# Patient Record
Sex: FEMALE | Race: WHITE | NOT HISPANIC OR LATINO | Employment: PART TIME | URBAN - METROPOLITAN AREA
[De-identification: names, ages, dates, MRNs, and addresses within clinical notes are randomized per-mention and may not be internally consistent; named-entity substitution may affect disease eponyms.]

---

## 2017-12-02 ENCOUNTER — HOSPITAL ENCOUNTER (EMERGENCY)
Facility: MEDICAL CENTER | Age: 27
End: 2017-12-02
Attending: EMERGENCY MEDICINE
Payer: COMMERCIAL

## 2017-12-02 VITALS
DIASTOLIC BLOOD PRESSURE: 80 MMHG | HEART RATE: 90 BPM | SYSTOLIC BLOOD PRESSURE: 140 MMHG | RESPIRATION RATE: 15 BRPM | TEMPERATURE: 98.6 F | HEIGHT: 68 IN | BODY MASS INDEX: 18.79 KG/M2 | WEIGHT: 124 LBS | OXYGEN SATURATION: 97 %
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
## 2017-12-02 LAB
HCG UR QL: NEGATIVE
HCG UR QL: NEGATIVE
SP GR UR REFRACTOMETRY: 1.03

## 2017-12-02 PROCEDURE — 99283 EMERGENCY DEPT VISIT LOW MDM: CPT

## 2017-12-02 PROCEDURE — 81025 URINE PREGNANCY TEST: CPT

## 2017-12-02 ASSESSMENT — PAIN SCALES - GENERAL
PAINLEVEL_OUTOF10: 7
PAINLEVEL_OUTOF10: 6

## 2017-12-02 ASSESSMENT — LIFESTYLE VARIABLES: DO YOU DRINK ALCOHOL: NO

## 2017-12-02 NOTE — ED NOTES
Pt given discharge and follow up instructions, all questions answered, pt verbalized understanding. Pt discharged with self, states feels safe to go home. Copy of discharge provided to pt. Signed copy in chart.  Pt states that all personal belongings are in possession. Pt ambulatory off unit.
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
## 2017-12-02 NOTE — DISCHARGE INSTRUCTIONS
General Assault  Assault includes any behavior or physical attack--whether it is on purpose or not--that results in injury to another person, damage to property, or both. This also includes assault that has not yet happened, but is planned to happen. Threats of assault may be physical, verbal, or written. They may be said or sent by:  · Mail.  · E-mail.  · Text.  · Social media.  · Fax.  The threats may be direct, implied, or understood.  WHAT ARE THE DIFFERENT FORMS OF ASSAULT?  Forms of assault include:  · Physically assaulting a person. This includes physical threats to inflict physical harm as well as:  ¨ Slapping.  ¨ Hitting.  ¨ Poking.  ¨ Kicking.  ¨ Punching.  ¨ Pushing.  · Sexually assaulting a person. Sexual assault is any sexual activity that a person is forced, threatened, or coerced to participate in. It may or may not involve physical contact with the person who is assaulting you. You are sexually assaulted if you are forced to have sexual contact of any kind.  · Damaging or destroying a person's assistive equipment, such as glasses, canes, or walkers.  · Throwing or hitting objects.  · Using or displaying a weapon to harm or threaten someone.  · Using or displaying an object that appears to be a weapon in a threatening manner.  · Using greater physical size or strength to intimidate someone.  · Making intimidating or threatening gestures.  · Bullying.  · Hazing.  · Using language that is intimidating, threatening, hostile, or abusive.  · Stalking.  · Restraining someone with force.  WHAT SHOULD I DO IF I EXPERIENCE ASSAULT?  · Report assaults, threats, and stalking to the police. Call your local emergency services (911 in the [place] if you are in immediate danger or you need medical help.   · You can work with a  or an advocate to get legal protection against someone who has assaulted you or threatened you with assault. Protection includes restraining orders and private addresses. Crimes against  you, such as assault, can also be prosecuted through the courts. Laws will vary depending on where you live.     This information is not intended to replace advice given to you by your health care provider. Make sure you discuss any questions you have with your health care provider.     Document Released: 12/18/2006 Document Revised: 01/08/2016 Document Reviewed: 09/04/2015  [employer] Interactive Patient Education ©2016 [employer]
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
## 2018-01-10 ENCOUNTER — NON-PROVIDER VISIT (OUTPATIENT)
Dept: OCCUPATIONAL MEDICINE | Facility: CLINIC | Age: 28
End: 2018-01-10

## 2018-01-10 DIAGNOSIS — Z02.1 PRE-EMPLOYMENT DRUG SCREENING: ICD-10-CM

## 2018-01-10 DIAGNOSIS — Z02.1 DRUG TESTING, PRE-EMPLOYMENT: ICD-10-CM

## 2018-01-10 LAB
AMP AMPHETAMINE: NORMAL
COC COCAINE: NORMAL
INT CON NEG: NORMAL
INT CON POS: NORMAL
MET METHAMPHETAMINES: NORMAL
OPI OPIATES: NORMAL
PCP PHENCYCLIDINE: NORMAL
POC DRUG COMMENT 753798-OCCUPATIONAL HEALTH: NEGATIVE
THC: NORMAL

## 2018-01-10 PROCEDURE — 80305 DRUG TEST PRSMV DIR OPT OBS: CPT | Performed by: PREVENTIVE MEDICINE

## 2018-01-11 ENCOUNTER — HOSPITAL ENCOUNTER (EMERGENCY)
Facility: MEDICAL CENTER | Age: 28
End: 2018-01-11
Attending: EMERGENCY MEDICINE
Payer: MEDICAID

## 2018-01-11 VITALS
OXYGEN SATURATION: 98 % | SYSTOLIC BLOOD PRESSURE: 121 MMHG | DIASTOLIC BLOOD PRESSURE: 65 MMHG | HEIGHT: 68 IN | HEART RATE: 74 BPM | RESPIRATION RATE: 16 BRPM | BODY MASS INDEX: 20.55 KG/M2 | WEIGHT: 135.58 LBS | TEMPERATURE: 97.5 F

## 2018-01-11 DIAGNOSIS — N72 CERVICITIS: ICD-10-CM

## 2018-01-11 LAB
APPEARANCE UR: ABNORMAL
BACTERIA GENITAL QL WET PREP: NORMAL
COLOR UR AUTO: YELLOW
GLUCOSE UR QL STRIP.AUTO: NEGATIVE MG/DL
HCG UR QL: NEGATIVE
KETONES UR QL STRIP.AUTO: NEGATIVE MG/DL
LEUKOCYTE ESTERASE UR QL STRIP.AUTO: NEGATIVE
NITRITE UR QL STRIP.AUTO: NEGATIVE
PH UR STRIP.AUTO: 7 [PH]
PROT UR QL STRIP: NEGATIVE MG/DL
RBC UR QL AUTO: NEGATIVE
SIGNIFICANT IND 70042: NORMAL
SITE SITE: NORMAL
SOURCE SOURCE: NORMAL
SP GR UR: 1.02

## 2018-01-11 PROCEDURE — 96372 THER/PROPH/DIAG INJ SC/IM: CPT

## 2018-01-11 PROCEDURE — 700102 HCHG RX REV CODE 250 W/ 637 OVERRIDE(OP): Performed by: EMERGENCY MEDICINE

## 2018-01-11 PROCEDURE — 700101 HCHG RX REV CODE 250: Performed by: EMERGENCY MEDICINE

## 2018-01-11 PROCEDURE — 81025 URINE PREGNANCY TEST: CPT

## 2018-01-11 PROCEDURE — A9270 NON-COVERED ITEM OR SERVICE: HCPCS | Performed by: EMERGENCY MEDICINE

## 2018-01-11 PROCEDURE — 700111 HCHG RX REV CODE 636 W/ 250 OVERRIDE (IP): Performed by: EMERGENCY MEDICINE

## 2018-01-11 PROCEDURE — 81002 URINALYSIS NONAUTO W/O SCOPE: CPT

## 2018-01-11 PROCEDURE — 87591 N.GONORRHOEAE DNA AMP PROB: CPT

## 2018-01-11 PROCEDURE — 87491 CHLMYD TRACH DNA AMP PROBE: CPT

## 2018-01-11 PROCEDURE — 99284 EMERGENCY DEPT VISIT MOD MDM: CPT

## 2018-01-11 RX ORDER — AZITHROMYCIN 250 MG/1
1000 TABLET, FILM COATED ORAL ONCE
Status: COMPLETED | OUTPATIENT
Start: 2018-01-11 | End: 2018-01-11

## 2018-01-11 RX ORDER — ONDANSETRON 4 MG/1
4 TABLET, ORALLY DISINTEGRATING ORAL ONCE
Status: COMPLETED | OUTPATIENT
Start: 2018-01-11 | End: 2018-01-11

## 2018-01-11 RX ORDER — LIDOCAINE HYDROCHLORIDE 10 MG/ML
0.9 INJECTION, SOLUTION INFILTRATION; PERINEURAL ONCE
Status: COMPLETED | OUTPATIENT
Start: 2018-01-11 | End: 2018-01-11

## 2018-01-11 RX ORDER — SULFAMETHOXAZOLE AND TRIMETHOPRIM 800; 160 MG/1; MG/1
1 TABLET ORAL 2 TIMES DAILY
Qty: 20 TAB | Refills: 0 | Status: SHIPPED
Start: 2018-01-11 | End: 2018-02-17

## 2018-01-11 RX ORDER — CEFTRIAXONE SODIUM 250 MG/1
250 INJECTION, POWDER, FOR SOLUTION INTRAMUSCULAR; INTRAVENOUS ONCE
Status: COMPLETED | OUTPATIENT
Start: 2018-01-11 | End: 2018-01-11

## 2018-01-11 RX ADMIN — ONDANSETRON 4 MG: 4 TABLET, ORALLY DISINTEGRATING ORAL at 20:16

## 2018-01-11 RX ADMIN — LIDOCAINE HYDROCHLORIDE 0.9 ML: 10 INJECTION, SOLUTION INFILTRATION; PERINEURAL at 20:15

## 2018-01-11 RX ADMIN — AZITHROMYCIN 1000 MG: 250 TABLET, FILM COATED ORAL at 20:15

## 2018-01-11 RX ADMIN — CEFTRIAXONE SODIUM 250 MG: 250 INJECTION, POWDER, FOR SOLUTION INTRAMUSCULAR; INTRAVENOUS at 20:15

## 2018-01-12 LAB
C TRACH DNA SPEC QL NAA+PROBE: NEGATIVE
N GONORRHOEA DNA SPEC QL NAA+PROBE: NEGATIVE
SPECIMEN SOURCE: NORMAL

## 2018-01-12 NOTE — DISCHARGE INSTRUCTIONS
Cervicitis  Cervicitis is a soreness and swelling (inflammation) of the cervix. Your cervix is located at the bottom of your uterus. It opens up to the vagina.  CAUSES   · Sexually transmitted infections (STIs).    · Allergic reaction.    · Medicines or birth control devices that are put in the vagina.    · Injury to the cervix.    · Bacterial infections.    RISK FACTORS  You are at greater risk if you:  · Have unprotected sexual intercourse.  · Have sexual intercourse with many partners.  · Began sexual intercourse at an early age.  · Have a history of STIs.  SYMPTOMS   There may be no symptoms. If symptoms occur, they may include:   · Gray, white, yellow, or bad-smelling vaginal discharge.    · Pain or itching of the area outside the vagina.    · Painful sexual intercourse.    · Lower abdominal or lower back pain, especially during intercourse.    · Frequent urination.    · Abnormal vaginal bleeding between periods, after sexual intercourse, or after menopause.    · Pressure or a heavy feeling in the pelvis.    DIAGNOSIS   Diagnosis is made after a pelvic exam. Other tests may include:   · Examination of any discharge under a microscope (wet prep).    · A Pap test.    TREATMENT   Treatment will depend on the cause of cervicitis. If it is caused by an STI, both you and your partner will need to be treated. Antibiotic medicines will be given.   HOME CARE INSTRUCTIONS   · Do not have sexual intercourse until your health care provider says it is okay.    · Do not have sexual intercourse until your partner has been treated, if your cervicitis is caused by an STI.    · Take your antibiotics as directed. Finish them even if you start to feel better.    SEEK MEDICAL CARE IF:  · Your symptoms come back.    · You have a fever.    MAKE SURE YOU:   · Understand these instructions.  · Will watch your condition.  · Will get help right away if you are not doing well or get worse.     This information is not intended to replace  advice given to you by your health care provider. Make sure you discuss any questions you have with your health care provider.     Document Released: 12/18/2006 Document Revised: 12/23/2014 Document Reviewed: 06/11/2014  Elsevier Interactive Patient Education ©2016 [employer]

## 2018-01-12 NOTE — ED NOTES
".  Chief Complaint   Patient presents with   • Vaginal Pain     x 1 week, reports yellow discharge, also reports a \"boil\", denies pregnancy, denies vaginal bleeding, had unprotected sex \"a couple\" of weeks ago with a new partner     ./64   Pulse 80   Temp 36.3 °C (97.3 °F)   Resp 16   Ht 1.734 m (5' 8.25\")   Wt 61.5 kg (135 lb 9.3 oz)   SpO2 99%   BMI 20.46 kg/m²     Ambulatory to triage with above complaints, educated on triage process, placed in lobby, told to inform staff of any changes in condition.    "

## 2018-01-12 NOTE — ED NOTES
Pt AOx4.  Pt given discharge instructions and pt verbalized understanding.  Pt ambulated to [facility].

## 2018-01-12 NOTE — ED PROVIDER NOTES
"ED Provider Note    CHIEF COMPLAINT  Chief Complaint   Patient presents with   • Vaginal Pain     x 1 week, reports yellow discharge, also reports a \"boil\", denies pregnancy, denies vaginal bleeding, had unprotected sex \"a couple\" of weeks ago with a new partner       HPI  Marly Tabares is a 27 y.o. female here for evaluation of some vaginal discharge. The patient also states she thinks she has a \"ingrown hair starting.\" The patient noted the vaginal discharge to be clear yellow type of color over the last couple of weeks, coinciding with the sexual relationship of her previous partner. She states that at the time this is her only partner, but is not her partner any longer. She wants to be sure that she does not have any type of STD. She also states that she has a small ingrown hair on the left side of the vaginal area, it does not hurt, but she is using warm soaks to alleviate the pain. She has no fever, no vomiting, and she denies any dysuria, urgency or frequency. She denies any pregnancy.    PAST MEDICAL HISTORY   has a past medical history of Asthma.    SOCIAL HISTORY  Social History     Social History Main Topics   • Smoking status: Current Every Day Smoker   • Smokeless tobacco: Never Used   • Alcohol use Yes      Comment: socially   • Drug use: No   • Sexual activity: Not on file       SURGICAL HISTORY  patient denies any surgical history    CURRENT MEDICATIONS  Home Medications    **Home medications have not yet been reviewed for this encounter**         ALLERGIES  No Known Allergies    REVIEW OF SYSTEMS  See HPI for further details. Review of systems as above, otherwise all other systems are negative.     PHYSICAL EXAM  VITAL SIGNS: /64   Pulse 80   Temp 36.3 °C (97.3 °F)   Resp 16   Ht 1.734 m (5' 8.25\")   Wt 61.5 kg (135 lb 9.3 oz)   SpO2 99%   BMI 20.46 kg/m²     Constitutional: Well developed, well nourished. No acute distress.  HEENT: Normocephalic, atraumatic. MMM  Neck: Supple, Full " range of motion   Chest/Pulmonary:  No respiratory distress.  Equal expansion   Gu:  Mild cmt, yellow discharge noted in the vault.  No external lesions noted.    Abd:  Soft, nontender, no p/s.   Musculoskeletal: No deformity, no edema, neurovascular intact.   Neuro: Clear speech, appropriate, cooperative, cranial nerves II-XII grossly intact.  Psych: Normal mood and affect    Results for orders placed or performed during the hospital encounter of 01/11/18   WET PREP   Result Value Ref Range    Significant Indicator NEG     Source GEN     Site VAGINAL     Wet Prep For Parasites       Moderate WBC's seen.  No yeast.  No motile Trichomonas seen.  No clue cells seen.     CHLAMYDIA & GC BY PCR   Result Value Ref Range    Source Vaginal    POC UA   Result Value Ref Range    POC Color Yellow     POC Appearance Slightly Cloudy (A)     POC Glucose Negative Negative mg/dL    POC Ketones Negative Negative mg/dL    POC Specific Gravity 1.025 1.005 - 1.030    POC Blood Negative Negative    POC Urine PH 7.0 5.0 - 8.0    POC Protein Negative Negative mg/dL    POC Nitrites Negative Negative    POC Leukocyte Esterase Negative Negative   POC URINE PREGNANCY   Result Value Ref Range    POC Urine Pregnancy Test Negative Negative           PROCEDURES     MEDICAL RECORD  I have reviewed patient's medical record and pertinent results are listed above.    COURSE & MEDICAL DECISION MAKING  I have reviewed any medical record information, laboratory studies and radiographic results as noted above.    I you have had any blood pressure issues while here in the emergency department, please see your doctor for a further evaluation or work up.    Differential diagnoses include but not limited to: Cervicitis, UTI, pregnancy    At this time, the patient is comfortable, nontoxic appearing, and has been treated here for a possible STD. She will follow up on her culture results in 3 days, or return here for any further issues or concerns. There is no  current Bartholin's abscess noted on exam, however I did write the patient a prescription for Bactrim in case she needed an antibiotic if it did show up.    This patient presents with cervicitis .  At this time, I have counseled the patient/family regarding their medications, pain control, and follow up.  They will continue their medications, if any, as prescribed.  They will return immediately for any worsening symptoms and/or any other medical concerns.  They will see their doctor, or contact the doctor provided, in 1-2 days for follow up.       FINAL IMPRESSION  1. Cervicitis        Electronically signed by: Vik Barker, 1/11/2018 8:14 PM

## 2018-01-25 ENCOUNTER — NON-PROVIDER VISIT (OUTPATIENT)
Dept: OCCUPATIONAL MEDICINE | Facility: CLINIC | Age: 28
End: 2018-01-25

## 2018-01-25 DIAGNOSIS — Z02.1 PRE-EMPLOYMENT DRUG SCREENING: ICD-10-CM

## 2018-01-25 DIAGNOSIS — Z02.83 ENCOUNTER FOR DRUG SCREENING: ICD-10-CM

## 2018-01-25 LAB
AMP AMPHETAMINE: NORMAL
BAR BARBITURATES: NORMAL
BZO BENZODIAZEPINES: NORMAL
COC COCAINE: NORMAL
INT CON NEG: NORMAL
INT CON POS: NORMAL
MDMA ECSTASY: NORMAL
MET METHAMPHETAMINES: NORMAL
MTD METHADONE: NORMAL
OPI OPIATES: NORMAL
OXY OXYCODONE: NORMAL
PCP PHENCYCLIDINE: NORMAL
POC URINE DRUG SCREEN OCDRS: NEGATIVE
THC: NORMAL

## 2018-01-25 PROCEDURE — 80305 DRUG TEST PRSMV DIR OPT OBS: CPT | Performed by: PREVENTIVE MEDICINE

## 2018-02-01 ENCOUNTER — HOSPITAL ENCOUNTER (EMERGENCY)
Facility: MEDICAL CENTER | Age: 28
End: 2018-02-01
Attending: EMERGENCY MEDICINE
Payer: MEDICAID

## 2018-02-01 VITALS
RESPIRATION RATE: 16 BRPM | SYSTOLIC BLOOD PRESSURE: 135 MMHG | HEART RATE: 66 BPM | WEIGHT: 134.92 LBS | TEMPERATURE: 98.6 F | DIASTOLIC BLOOD PRESSURE: 78 MMHG | BODY MASS INDEX: 20.36 KG/M2 | OXYGEN SATURATION: 99 %

## 2018-02-01 DIAGNOSIS — Z91.148 HISTORY OF MEDICATION NONCOMPLIANCE: ICD-10-CM

## 2018-02-01 DIAGNOSIS — N89.8 VAGINAL DISCHARGE: ICD-10-CM

## 2018-02-01 DIAGNOSIS — N76.1 SUBACUTE VAGINITIS: ICD-10-CM

## 2018-02-01 DIAGNOSIS — N72 CERVICITIS: ICD-10-CM

## 2018-02-01 LAB
ALBUMIN SERPL BCP-MCNC: 4.8 G/DL (ref 3.2–4.9)
ALBUMIN/GLOB SERPL: 1.6 G/DL
ALP SERPL-CCNC: 51 U/L (ref 30–99)
ALT SERPL-CCNC: 13 U/L (ref 2–50)
ANION GAP SERPL CALC-SCNC: 7 MMOL/L (ref 0–11.9)
AST SERPL-CCNC: 19 U/L (ref 12–45)
BASOPHILS # BLD AUTO: 0.8 % (ref 0–1.8)
BASOPHILS # BLD: 0.04 K/UL (ref 0–0.12)
BILIRUB SERPL-MCNC: 0.4 MG/DL (ref 0.1–1.5)
BUN SERPL-MCNC: 15 MG/DL (ref 8–22)
CALCIUM SERPL-MCNC: 9.4 MG/DL (ref 8.5–10.5)
CHLORIDE SERPL-SCNC: 105 MMOL/L (ref 96–112)
CO2 SERPL-SCNC: 24 MMOL/L (ref 20–33)
CREAT SERPL-MCNC: 0.78 MG/DL (ref 0.5–1.4)
EOSINOPHIL # BLD AUTO: 0.11 K/UL (ref 0–0.51)
EOSINOPHIL NFR BLD: 2.1 % (ref 0–6.9)
ERYTHROCYTE [DISTWIDTH] IN BLOOD BY AUTOMATED COUNT: 50.5 FL (ref 35.9–50)
GLOBULIN SER CALC-MCNC: 3 G/DL (ref 1.9–3.5)
GLUCOSE SERPL-MCNC: 92 MG/DL (ref 65–99)
HCG UR QL: NEGATIVE
HCT VFR BLD AUTO: 44 % (ref 37–47)
HGB BLD-MCNC: 14 G/DL (ref 12–16)
IMM GRANULOCYTES # BLD AUTO: 0.02 K/UL (ref 0–0.11)
IMM GRANULOCYTES NFR BLD AUTO: 0.4 % (ref 0–0.9)
LYMPHOCYTES # BLD AUTO: 2.07 K/UL (ref 1–4.8)
LYMPHOCYTES NFR BLD: 40.1 % (ref 22–41)
MCH RBC QN AUTO: 30.4 PG (ref 27–33)
MCHC RBC AUTO-ENTMCNC: 31.8 G/DL (ref 33.6–35)
MCV RBC AUTO: 95.4 FL (ref 81.4–97.8)
MONOCYTES # BLD AUTO: 0.45 K/UL (ref 0–0.85)
MONOCYTES NFR BLD AUTO: 8.7 % (ref 0–13.4)
NEUTROPHILS # BLD AUTO: 2.47 K/UL (ref 2–7.15)
NEUTROPHILS NFR BLD: 47.9 % (ref 44–72)
NRBC # BLD AUTO: 0 K/UL
NRBC BLD-RTO: 0 /100 WBC
PLATELET # BLD AUTO: 295 K/UL (ref 164–446)
PMV BLD AUTO: 10.5 FL (ref 9–12.9)
POTASSIUM SERPL-SCNC: 3.8 MMOL/L (ref 3.6–5.5)
PROT SERPL-MCNC: 7.8 G/DL (ref 6–8.2)
RBC # BLD AUTO: 4.61 M/UL (ref 4.2–5.4)
SODIUM SERPL-SCNC: 136 MMOL/L (ref 135–145)
WBC # BLD AUTO: 5.2 K/UL (ref 4.8–10.8)

## 2018-02-01 PROCEDURE — 80053 COMPREHEN METABOLIC PANEL: CPT

## 2018-02-01 PROCEDURE — 36415 COLL VENOUS BLD VENIPUNCTURE: CPT

## 2018-02-01 PROCEDURE — 96372 THER/PROPH/DIAG INJ SC/IM: CPT

## 2018-02-01 PROCEDURE — 99284 EMERGENCY DEPT VISIT MOD MDM: CPT

## 2018-02-01 PROCEDURE — 85025 COMPLETE CBC W/AUTO DIFF WBC: CPT

## 2018-02-01 PROCEDURE — 700101 HCHG RX REV CODE 250: Performed by: EMERGENCY MEDICINE

## 2018-02-01 PROCEDURE — 700111 HCHG RX REV CODE 636 W/ 250 OVERRIDE (IP): Performed by: EMERGENCY MEDICINE

## 2018-02-01 PROCEDURE — 81025 URINE PREGNANCY TEST: CPT

## 2018-02-01 RX ORDER — LIDOCAINE HYDROCHLORIDE 10 MG/ML
20 INJECTION, SOLUTION INFILTRATION; PERINEURAL ONCE
Status: COMPLETED | OUTPATIENT
Start: 2018-02-01 | End: 2018-02-01

## 2018-02-01 RX ORDER — CEFTRIAXONE 1 G/1
1 INJECTION, POWDER, FOR SOLUTION INTRAMUSCULAR; INTRAVENOUS ONCE
Status: COMPLETED | OUTPATIENT
Start: 2018-02-01 | End: 2018-02-01

## 2018-02-01 RX ORDER — METRONIDAZOLE 500 MG/1
500 TABLET ORAL 2 TIMES DAILY
Qty: 14 TAB | Refills: 0 | Status: SHIPPED | OUTPATIENT
Start: 2018-02-01 | End: 2018-02-08

## 2018-02-01 RX ADMIN — CEFTRIAXONE SODIUM 1 G: 1 INJECTION, POWDER, FOR SOLUTION INTRAMUSCULAR; INTRAVENOUS at 15:39

## 2018-02-01 RX ADMIN — LIDOCAINE HYDROCHLORIDE 2.1 ML: 10 INJECTION, SOLUTION INFILTRATION; PERINEURAL at 15:39

## 2018-02-01 ASSESSMENT — PAIN SCALES - GENERAL: PAINLEVEL_OUTOF10: 0

## 2018-02-01 NOTE — ED TRIAGE NOTES
".  Chief Complaint   Patient presents with   • Vaginal Discharge     amb to triage.  States seen 2 weeks ago for vaginal bleeding and discharge.  States still having same problems.  \" I have a metalic smell to vaginal discharge, something is just not right\".  Educated in triage.  Returned to Lobby.        "

## 2018-02-01 NOTE — ED NOTES
VSS.  Discharge instructions and prescriptions x 1 given- verbalizes understanding.   Ambulatory to lobby with steady gait.

## 2018-02-01 NOTE — ED PROVIDER NOTES
"ED Provider Note    Scribed for Allen Allen M.D. by Nydia Jack. 2/1/2018  3:06 PM    Primary Care Provider: Pcp Pt States None      CHIEF COMPLAINT  Chief Complaint   Patient presents with   • Vaginal Discharge     amb to triage.  States seen 2 weeks ago for vaginal bleeding and discharge.  States still having same problems.  \" I have a metalic smell to vaginal discharge, something is just not right\".  Educated in triage.  Returned to [facility].        [facility]  Marly Tabares is a 27 y.o. female who presents to the ED complaining of vaginal discharge for a month and a half. The patient's had abnormal vaginal discharge foul-smelling at times. She was seen here in the emergency department on January 11 diagnosis cervicitis but never got her medications filled. She comes back in here now exactly the same symptoms. Nothing makes it better or make sure  She states she knew she had cervicitis but she didn't think that the antibiotics would help her.    REVIEW OF SYSTEMS    CONSTITUTIONAL:  Denies fever, chills, weight gain/loss, or weakness.  EYES:  Denies photophobia or discharge.   ENT:  Denies sore throat, nose, or ear pain.  CARDIOVASCULAR:  Denies chest pain, palpitations, or swelling.  RESPIRATORY:  Denies cough, shortness of breath, difficulty breathing.  GI:  Denies abdominal pain, nausea, vomiting, or diarrhea.  : Endorses vaginal bleeding and foul-smelling vaginal discharge.   MUSCULOSKELETAL:  Denies weakness, joint swelling, or back pain.  SKIN:  No rash or bruising.  NEUROLOGIC:  Denies headache, focal weakness, or numbness.  C.    PAST MEDICAL HISTORY  Past Medical History:   Diagnosis Date   • Asthma        FAMILY HISTORY  No family history noted    SOCIAL HISTORY   reports that she has been smoking.  She has never used smokeless tobacco. She reports that she drinks alcohol. She reports that she does not use drugs.    SURGICAL HISTORY  History reviewed. No pertinent surgical history.    CURRENT " MEDICATIONS  No current facility-administered medications on file prior to encounter.      Current Outpatient Prescriptions on File Prior to Encounter   Medication Sig Dispense Refill   • sulfamethoxazole-trimethoprim (BACTRIM DS) 800-160 MG tablet Take 1 Tab by mouth 2 times a day. 20 Tab 0       ALLERGIES  No Known Allergies    PHYSICAL EXAM  VITAL SIGNS: /77   Pulse 64   Temp 37 °C (98.6 °F)   Resp 16   Wt 61.2 kg (134 lb 14.7 oz)   SpO2 100%   BMI 20.36 kg/m²      Constitutional: Patient is awake and alert. No acute respiratory distress. Well developed, Well nourished, Non-toxic appearance.  HENT: Normocephalic, Atraumatic, Bilateral external ears normal, Oropharynx pink moist with no exudates, Nose patent.  Neck: Non-tender  Cardiovascular: Heart is regular rate and rhythm   Thorax & Lungs: Chest is symmetrical, with good breath sounds. No wheezing or crackles. No respiratory distress,   Abdomen: Soft, No tenderness no hepatosplenomegaly there is no guarding or rebound, No masses, No pulsatile masses.   Skin: Warm, Dry, no  rash.   Back: Non tender with palpation, No CVA tenderness.   Extremities: No edema.   Musculoskeletal: Good range of motion to upper and lower extremities   Neurologic: Alert & oriented Strength is symmetrical in upper and lower extremities  Psychiatric: Anxious    LABS  Results for orders placed or performed during the hospital encounter of 02/01/18   CBC WITH DIFFERENTIAL   Result Value Ref Range    WBC 5.2 4.8 - 10.8 K/uL    RBC 4.61 4.20 - 5.40 M/uL    Hemoglobin 14.0 12.0 - 16.0 g/dL    Hematocrit 44.0 37.0 - 47.0 %    MCV 95.4 81.4 - 97.8 fL    MCH 30.4 27.0 - 33.0 pg    MCHC 31.8 (L) 33.6 - 35.0 g/dL    RDW 50.5 (H) 35.9 - 50.0 fL    Platelet Count 295 164 - 446 K/uL    MPV 10.5 9.0 - 12.9 fL    Neutrophils-Polys 47.90 44.00 - 72.00 %    Lymphocytes 40.10 22.00 - 41.00 %    Monocytes 8.70 0.00 - 13.40 %    Eosinophils 2.10 0.00 - 6.90 %    Basophils 0.80 0.00 - 1.80 %     Immature Granulocytes 0.40 0.00 - 0.90 %    Nucleated RBC 0.00 /100 WBC    Neutrophils (Absolute) 2.47 2.00 - 7.15 K/uL    Lymphs (Absolute) 2.07 1.00 - 4.80 K/uL    Monos (Absolute) 0.45 0.00 - 0.85 K/uL    Eos (Absolute) 0.11 0.00 - 0.51 K/uL    Baso (Absolute) 0.04 0.00 - 0.12 K/uL    Immature Granulocytes (abs) 0.02 0.00 - 0.11 K/uL    NRBC (Absolute) 0.00 K/uL   COMP METABOLIC PANEL   Result Value Ref Range    Sodium 136 135 - 145 mmol/L    Potassium 3.8 3.6 - 5.5 mmol/L    Chloride 105 96 - 112 mmol/L    Co2 24 20 - 33 mmol/L    Anion Gap 7.0 0.0 - 11.9    Glucose 92 65 - 99 mg/dL    Bun 15 8 - 22 mg/dL    Creatinine 0.78 0.50 - 1.40 mg/dL    Calcium 9.4 8.5 - 10.5 mg/dL    AST(SGOT) 19 12 - 45 U/L    ALT(SGPT) 13 2 - 50 U/L    Alkaline Phosphatase 51 30 - 99 U/L    Total Bilirubin 0.4 0.1 - 1.5 mg/dL    Albumin 4.8 3.2 - 4.9 g/dL    Total Protein 7.8 6.0 - 8.2 g/dL    Globulin 3.0 1.9 - 3.5 g/dL    A-G Ratio 1.6 g/dL   ESTIMATED GFR   Result Value Ref Range    GFR If African American >60 >60 mL/min/1.73 m 2    GFR If Non African American >60 >60 mL/min/1.73 m 2   POC URINE PREGNANCY   Result Value Ref Range    POC Urine Pregnancy Test Negative Negative     All labs reviewed by me.    COURSE & MEDICAL DECISION MAKING  Pertinent Labs & Imaging studies reviewed. (See chart for details)    Differential diagnoses include but are not limited to cervicitis, pelvic inflammatory disease, vaginitis, appendicitis, intussusception, volvulus, urinary tract infections.    1:56 PM Ordered CBC, CMP, Estimated GFR.    3:06 PM - Patient seen and examined at bedside. Discussed patient's lab results and discussed that she will be treated with 1g Rocephin.     Decision Making  I reviewed her chart from last time she was here she had lots of WBCs. I suspect she's got cervicitis is diagnosed before possibly vaginitis clinically I yet. He as well as she really doesn't have any pelvis pain at all. This period of time since she has  not gotten treatment for her cervicitis and vaginitis previously we will treat her. I will give her a shot of Rocephin and placed on oral antibiotics as an outpatient. I explained to her important is for close follow-up.     The patient is referred to a primary physician for blood pressure management, diabetic screening, and for all other preventative health concerns.    It is a patient is diagnosed cervicitis and had a lot of WBCs on her vaginal exam last time she was here. Complains of continued vaginal discharge and foul-smelling. She is not toxic. She did not fill her medications from the last time she is in the hospital. This time I do not think redoing a pelvic exam would change anything since she needs to be treated for her cervicitis and vaginitis that she had on her previous visit. In case this is gotten any worse we'll give her a shot of Rocephin and we will place her on Flagyl. She understands she is close follow-up as an outpatient she cannot drink alcohol Flagyl. And again she will need to have a reexamination when she's taken her medications to see if her symptoms are resolved.  Clinically I do not believe she has a surgical abdomen or appendicitis at this time.    DISPOSITION:  Patient will be discharged home in stable condition.    FOLLOW UP:  [address] [id number] [phone]  In 1 week        OUTPATIENT MEDICATIONS:  New Prescriptions    METRONIDAZOLE (FLAGYL) 500 MG TAB    Take 1 Tab by mouth 2 Times a Day for 7 days.         FINAL IMPRESSION  Vaginal discharge  Cervicitis/vaginitis from previous exam  Noncompliant medication    PLAN  1. Follow-up with the [facility] within 7 days  2. Flagyl  3. PID/cervicitis/vaginitis information sheets  4.. Return to the emergency department for increased pains, fevers, vomiting or change in condition.     I, Nydia Jack (Scribe), am scribing for, and in the presence of, Allen Allen,  M.D..    Electronically signed by: Nydia Jack (Scribe), 2/1/2018    IAllen M.D. personally performed the services described in this documentation, as scribed by Nydia Jack in my presence, and it is both accurate and complete.    The note accurately reflects work and decisions made by me.  Allen Allen  2/1/2018  8:52 PM

## 2018-02-01 NOTE — ED NOTES
Ambulatory to room with same report as triage note, chart up for ERP.  Urine obtained and POC done

## 2018-02-01 NOTE — DISCHARGE INSTRUCTIONS
Cervicitis  Cervicitis is a soreness and puffiness (inflammation) of the cervix.   HOME CARE  · Do not have sex (intercourse) until your doctor says it is okay.  · Do not have sex until your partner is treated or as told by your doctor.  · Take your antibiotic medicine as told. Finish it even if you start to feel better.  GET HELP IF:   · Your symptoms that brought you to the doctor come back.  · You have a fever.  MAKE SURE YOU:   · Understand these instructions.  · Will watch your condition.  · Will get help right away if you are not doing well or get worse.     This information is not intended to replace advice given to you by your health care provider. Make sure you discuss any questions you have with your health care provider.     Document Released: 09/26/2009 Document Revised: 12/23/2014 Document Reviewed: 06/11/2014  [employer] Interactive Patient Education ©2016 [employer] Inc.      Bacterial Vaginosis  Bacterial vaginosis is an infection of the vagina. It happens when too many germs (bacteria) grow in the vagina. Having this infection puts you at risk for getting other infections from sex. Treating this infection can help lower your risk for other infections, such as:   · Chlamydia.  · Gonorrhea.  · HIV.  · Herpes.  HOME CARE  · Take your medicine as told by your doctor.  · Finish your medicine even if you start to feel better.  · Tell your sex partner that you have an infection. They should see their doctor for treatment.  · During treatment:  ¨ Avoid sex or use condoms correctly.  ¨ Do not douche.  ¨ Do not drink alcohol unless your doctor tells you it is ok.  ¨ Do not breastfeed unless your doctor tells you it is ok.  GET HELP IF:  · You are not getting better after 3 days of treatment.  · You have more grey fluid (discharge) coming from your vagina than before.  · You have more pain than before.  · You have a fever.  MAKE SURE YOU:   · Understand these instructions.  · Will watch your condition.  · Will get  help right away if you are not doing well or get worse.     This information is not intended to replace advice given to you by your health care provider. Make sure you discuss any questions you have with your health care provider.     Document Released: 09/26/2009 Document Revised: 01/08/2016 Document Reviewed: 07/30/2014  [employer] Interactive Patient Education ©2016 [employer] Inc.

## 2018-02-17 ENCOUNTER — HOSPITAL ENCOUNTER (EMERGENCY)
Facility: MEDICAL CENTER | Age: 28
End: 2018-02-17
Attending: EMERGENCY MEDICINE
Payer: MEDICAID

## 2018-02-17 VITALS
HEIGHT: 68 IN | BODY MASS INDEX: 20 KG/M2 | DIASTOLIC BLOOD PRESSURE: 72 MMHG | RESPIRATION RATE: 14 BRPM | TEMPERATURE: 98.5 F | HEART RATE: 65 BPM | OXYGEN SATURATION: 99 % | WEIGHT: 132 LBS | SYSTOLIC BLOOD PRESSURE: 128 MMHG

## 2018-02-17 DIAGNOSIS — B96.89 BACTERIAL VAGINOSIS: ICD-10-CM

## 2018-02-17 DIAGNOSIS — N76.0 BACTERIAL VAGINOSIS: ICD-10-CM

## 2018-02-17 LAB
BACTERIA GENITAL QL WET PREP: NORMAL
SIGNIFICANT IND 70042: NORMAL
SITE SITE: NORMAL
SOURCE SOURCE: NORMAL

## 2018-02-17 PROCEDURE — 87491 CHLMYD TRACH DNA AMP PROBE: CPT

## 2018-02-17 PROCEDURE — 87591 N.GONORRHOEAE DNA AMP PROB: CPT

## 2018-02-17 PROCEDURE — 99284 EMERGENCY DEPT VISIT MOD MDM: CPT

## 2018-02-17 RX ORDER — METRONIDAZOLE 500 MG/1
500 TABLET ORAL 3 TIMES DAILY
Qty: 21 TAB | Refills: 0 | Status: SHIPPED | OUTPATIENT
Start: 2018-02-17 | End: 2018-02-24

## 2018-02-17 RX ORDER — SULFAMETHOXAZOLE AND TRIMETHOPRIM 800; 160 MG/1; MG/1
1 TABLET ORAL 2 TIMES DAILY
Qty: 14 TAB | Refills: 0 | Status: SHIPPED | OUTPATIENT
Start: 2018-02-17 | End: 2018-02-24

## 2018-02-17 NOTE — ED PROVIDER NOTES
"ED Provider Note    CHIEF COMPLAINT  Chief Complaint   Patient presents with   • Yeast Infection       HPI  Marly Tabares is a 27 y.o. female who presents for evaluation of vaginal discharge and itching pruritus. The patient reports that she had an infected ingrown hair about 2 weeks ago took a course of oral antibiotics and then got yeast infection. She's been trying over-the-counter Monistat vaginal cream with no improvement. She denies possibility of pregnancy. No high fevers chills flank pain or hematuria no dysuria. No lower abdominal pain. No other symptoms reported    REVIEW OF SYSTEMS  See HPI for further details. High fevers lethargy flank pain night sweats weight loss All other systems are negative.     PAST MEDICAL HISTORY  Past Medical History:   Diagnosis Date   • Asthma        FAMILY HISTORY  No history of bleeding disorder    SOCIAL HISTORY  Social History     Social History   • Marital status: Single     Spouse name: N/A   • Number of children: N/A   • Years of education: N/A     Social History Main Topics   • Smoking status: Current Every Day Smoker   • Smokeless tobacco: Never Used   • Alcohol use Yes      Comment: socially   • Drug use: No   • Sexual activity: Not on file     Other Topics Concern   • Not on file     Social History Narrative   • No narrative on file     Smoke cigarettes  SURGICAL HISTORY  No past surgical history on file.  No major surgeries  CURRENT MEDICATIONS  Home Medications     Reviewed by Lashay Montero R.N. (Registered Nurse) on 02/17/18 at 1359  Med List Status: Complete   Medication Last Dose Status   albuterol-ipratropium (COMBIVENT)  MCG/ACT Aerosol PRN Active   Pseudoeph-Doxylamine-DM-APAP (NYQUIL PO)  Active            No regular medications    ALLERGIES  No Known Allergies    PHYSICAL EXAM  VITAL SIGNS: /74   Pulse 61   Temp 36.9 °C (98.5 °F)   Resp 14   Ht 1.727 m (5' 8\")   Wt 59.9 kg (132 lb)   SpO2 99%   BMI 20.07 kg/m²  Room air O2: " 99    Constitutional: Well developed, Well nourished, No acute distress, Non-toxic appearance.   HENT: Normocephalic, Atraumatic, Bilateral external ears normal, Oropharynx moist, No oral exudates, Nose normal.   Eyes: PERRLA, EOMI, Conjunctiva normal, No discharge.   Cardiovascular: Normal heart rate, Normal rhythm, No murmurs, No rubs, No gallops.   Thorax & Lungs: Normal breath sounds, No respiratory distress, No wheezing, No chest tenderness.   Abdomen: Bowel sounds normal, Soft, No tenderness, No masses, No pulsatile masses.   Genitalia. Female nurse, Lashay was in the room. External genitalia were normal. Speculum exam revealed copious white curdy discharge area there is also a 3 x 4 cm area of erythema on the left lateral groin not involving the labia consistent with mild cellulitis from likely ingrown hair without evidence of abscess  Skin: Warm, Dry, No erythema, No rash.   Back: No tenderness, No CVA tenderness.   Extremities: Intact distal pulses, No edema, No tenderness, No cyanosis, No clubbing.   Neurologic: Alert & oriented x 3, Normal motor function, Normal sensory function, No focal deficits noted.   Psychiatric: Anxious         COURSE & MEDICAL DECISION MAKING  Pertinent Labs & Imaging studies reviewed. (See chart for details)  Results for orders placed or performed during the hospital encounter of 02/17/18   CHLAMYDIA/GC PCR URINE OR SWAB   Result Value Ref Range    Source Vaginal    WET PREP   Result Value Ref Range    Significant Indicator NEG     Source GEN     Site VAGINAL     Wet Prep For Parasites       Many WBC's seen.  Many clue cells seen.  No yeast.  No motile Trichomonas seen.       Patient here had evidence of bacterial vaginosis on pelvic exam as well as wet prep. We did not see any Trichomonas or yeast. Gonorrhea and chlamydia is pending. She reported that she could not urinate and reports is no possibility she is pregnant and has refused pregnancy test. I will start her on Flagyl  for her vaginosis as well as Bactrim for mild localized cellulitis without abscess. I have recommended abstinence from alcohol while taking these medications. Return precautions have been reviewed    FINAL IMPRESSION  1.   1. Bacterial vaginosis      2. Left groin cellulitis         Electronically signed by: Hollis Sommer, 2/17/2018 1:39 PM

## 2018-02-17 NOTE — DISCHARGE INSTRUCTIONS
Bacterial Vaginosis  Bacterial vaginosis is a vaginal infection that occurs when the normal balance of bacteria in the vagina is disrupted. It results from an overgrowth of certain bacteria. This is the most common vaginal infection in women of childbearing age. Treatment is important to prevent complications, especially in pregnant women, as it can cause a premature delivery.  CAUSES   Bacterial vaginosis is caused by an increase in harmful bacteria that are normally present in smaller amounts in the vagina. Several different kinds of bacteria can cause bacterial vaginosis. However, the reason that the condition develops is not fully understood.  RISK FACTORS  Certain activities or behaviors can put you at an increased risk of developing bacterial vaginosis, including:  · Having a new sex partner or multiple sex partners.  · Douching.  · Using an intrauterine device (IUD) for contraception.  Women do not get bacterial vaginosis from toilet seats, bedding, swimming pools, or contact with objects around them.  SIGNS AND SYMPTOMS   Some women with bacterial vaginosis have no signs or symptoms. Common symptoms include:  · Grey vaginal discharge.  · A fishlike odor with discharge, especially after sexual intercourse.  · Itching or burning of the vagina and vulva.  · Burning or pain with urination.  DIAGNOSIS   Your health care provider will take a medical history and examine the vagina for signs of bacterial vaginosis. A sample of vaginal fluid may be taken. Your health care provider will look at this sample under a microscope to check for bacteria and abnormal cells. A vaginal pH test may also be done.   TREATMENT   Bacterial vaginosis may be treated with antibiotic medicines. These may be given in the form of a pill or a vaginal cream. A second round of antibiotics may be prescribed if the condition comes back after treatment. Because bacterial vaginosis increases your risk for sexually transmitted diseases, getting  treated can help reduce your risk for chlamydia, gonorrhea, HIV, and herpes.  HOME CARE INSTRUCTIONS   · Only take over-the-counter or prescription medicines as directed by your health care provider.  · If antibiotic medicine was prescribed, take it as directed. Make sure you finish it even if you start to feel better.  · Tell all sexual partners that you have a vaginal infection. They should see their health care provider and be treated if they have problems, such as a mild rash or itching.  · During treatment, it is important that you follow these instructions:  ¨ Avoid sexual activity or use condoms correctly.  ¨ Do not douche.  ¨ Avoid alcohol as directed by your health care provider.  ¨ Avoid breastfeeding as directed by your health care provider.  SEEK MEDICAL CARE IF:   · Your symptoms are not improving after 3 days of treatment.  · You have increased discharge or pain.  · You have a fever.  MAKE SURE YOU:   · Understand these instructions.  · Will watch your condition.  · Will get help right away if you are not doing well or get worse.  FOR MORE INFORMATION   Centers for Disease Control and Prevention, Division of STD Prevention: www.cdc.gov/std  American Sexual Health Association (AMELIA): www.ashastd.org      This information is not intended to replace advice given to you by your health care provider. Make sure you discuss any questions you have with your health care provider.     Document Released: 12/18/2006 Document Revised: 01/08/2016 Document Reviewed: 07/30/2014  [employer] Interactive Patient Education ©2016 [employer]

## 2018-02-17 NOTE — ED NOTES
"Pt to [facility], plevic exam complete with myself assisting. Samples sent to lab. Pt states that she is unable to urinate at this time. States \"I know I am not pregnant, I haven't been able to get pregnant in years\" Dr. Robles notified.   "

## 2018-02-17 NOTE — ED TRIAGE NOTES
"Pt ambulatory to triage c/o yeast infection symptoms and and \"ingrown hair down there\" pt states she was recently treated with antibiotic for this and states \"i need more bc it didn't go away\" pt also states has cold symptoms  "

## 2018-02-22 RX ORDER — FLUTICASONE PROPIONATE 50 MCG
SPRAY, SUSPENSION (ML) NASAL
COMMUNITY
Start: 2017-04-19 | End: 2018-06-30

## 2018-02-22 RX ORDER — LEVONORGESTREL AND ETHINYL ESTRADIOL 0.15-0.03
KIT ORAL
COMMUNITY
Start: 2016-10-17 | End: 2018-06-30

## 2018-02-22 RX ORDER — LEVONORGESTREL 1.5 MG/1
TABLET ORAL
COMMUNITY
Start: 2016-10-14 | End: 2018-06-30

## 2018-02-24 ENCOUNTER — HOSPITAL ENCOUNTER (EMERGENCY)
Facility: MEDICAL CENTER | Age: 28
End: 2018-02-24
Attending: EMERGENCY MEDICINE
Payer: MEDICAID

## 2018-02-24 VITALS
HEIGHT: 68 IN | WEIGHT: 123.68 LBS | SYSTOLIC BLOOD PRESSURE: 118 MMHG | DIASTOLIC BLOOD PRESSURE: 84 MMHG | OXYGEN SATURATION: 98 % | RESPIRATION RATE: 16 BRPM | BODY MASS INDEX: 18.74 KG/M2 | HEART RATE: 68 BPM | TEMPERATURE: 97.7 F

## 2018-02-24 DIAGNOSIS — H16.8 KERATITIS SECONDARY TO CONTACT LENS: ICD-10-CM

## 2018-02-24 DIAGNOSIS — H18.829 KERATITIS SECONDARY TO CONTACT LENS: ICD-10-CM

## 2018-02-24 PROCEDURE — 700102 HCHG RX REV CODE 250 W/ 637 OVERRIDE(OP): Performed by: EMERGENCY MEDICINE

## 2018-02-24 PROCEDURE — 99284 EMERGENCY DEPT VISIT MOD MDM: CPT

## 2018-02-24 PROCEDURE — 700101 HCHG RX REV CODE 250: Performed by: EMERGENCY MEDICINE

## 2018-02-24 PROCEDURE — A9270 NON-COVERED ITEM OR SERVICE: HCPCS | Performed by: EMERGENCY MEDICINE

## 2018-02-24 RX ORDER — PROPARACAINE HYDROCHLORIDE 5 MG/ML
1 SOLUTION/ DROPS OPHTHALMIC ONCE
Status: COMPLETED | OUTPATIENT
Start: 2018-02-24 | End: 2018-02-24

## 2018-02-24 RX ORDER — OFLOXACIN 3 MG/ML
1 SOLUTION/ DROPS OPHTHALMIC 4 TIMES DAILY
Qty: 1 BOTTLE | Refills: 0 | Status: SHIPPED | OUTPATIENT
Start: 2018-02-24 | End: 2018-06-30

## 2018-02-24 RX ORDER — BENOXINATE HCL/FLUORESCEIN SOD 0.4%-0.25%
1-2 DROPS OPHTHALMIC (EYE) ONCE
Status: COMPLETED | OUTPATIENT
Start: 2018-02-24 | End: 2018-02-24

## 2018-02-24 RX ORDER — ERYTHROMYCIN 5 MG/G
OINTMENT OPHTHALMIC
Qty: 1 TUBE | Refills: 0 | Status: SHIPPED | OUTPATIENT
Start: 2018-02-24 | End: 2018-06-30

## 2018-02-24 RX ADMIN — FLUORESCEIN SODIUM AND BENOXINATE HYDROCHLORIDE 1 DROP: 4; 2.5 SOLUTION OPHTHALMIC at 16:30

## 2018-02-24 RX ADMIN — PROPARACAINE HYDROCHLORIDE 1 DROP: 5 SOLUTION/ DROPS OPHTHALMIC at 16:30

## 2018-02-24 ASSESSMENT — PAIN SCALES - GENERAL: PAINLEVEL_OUTOF10: 10

## 2018-02-24 NOTE — ED TRIAGE NOTES
"Pt EPI MICHEL, pt to triage, c/o headache , states \" she did drink a lot lastnight, but doesn't think this is a hangover, c/o + photophobia and blurred vision as well , when this RN asked about how much she drank lastnight , pt became defensive and said it \" wouldn't fucking cause her eyes to hurt\"   "

## 2018-02-25 ENCOUNTER — PATIENT OUTREACH (OUTPATIENT)
Dept: HEALTH INFORMATION MANAGEMENT | Facility: OTHER | Age: 28
End: 2018-02-25

## 2018-02-25 NOTE — DISCHARGE INSTRUCTIONS
These instructions are the closest we have to contact lens keratitis.  Antibiotics, either drops or ointment.  Artificial tears will also help.  Ibuprofen 600mg 3x/day with food will help with inflammation.  I anticipate you will be back to normal in 36-48 hours.  If not, follow up with eye specialist for recheck.  Return to ER if unable to see get into specialist.    Ultraviolet Keratitis  Ultraviolet Keratitis can occur when too much UV light enters the cornea. The cornea is the clear cover on the front part of your eye that helps focus light. It protects your eyes from dust and other foreign bodies and filters ultraviolet rays. This condition can be caused by exposure to snow (snow blindness) from the reflected or direct sunlight. It may also be caused by exposure to welding arcs or halogen lamps (flashburn) and prolonged exposure to direct sunlight. Brief exposure can result in severe problems several hours later.  CAUSES   Causes of Ultraviolet Keratitis include:  · Exposure to snow (snow blindness) from the reflected or direct sunlight.  · Exposure to welding arcs or halogen lamps (flashburn).  · Prolonged exposure to direct sunlight.  SYMPTOMS   The symptoms of Ultraviolet Keratitis usually start 6 to 12 hours after the damage occurred. They may include the following:   · Tearing.  · Light sensitivity.  · Gritty sensation in eyes.  · Swelling of your eyelids.  · Severe pain.  In spite of the pain, this condition will usually improve within 24 hours even without treatment.  HOME CARE INSTRUCTIONS   · Apply cold packs on your eyes to ease the pain.  · Only take over-the-counter or prescription medicines for pain, discomfort, or fever as directed by your caregiver.  · Your caregiver may also prescribe an antibiotic ointment to help prevent infection and/or additional medications for pain relief.  · Apply an eye patch to help relieve discomfort and assist in healing.   · Follow the instructions given to you by  your caregiver.  · Do not rub your eyes.  · If your caregiver has given you a follow-up appointment, it is very important to keep that appointment. Not keeping the appointment could result in a severe eye infection or permanent loss of vision. If there is any problem keeping the appointment, you must call back to this facility for assistance.  SEEK IMMEDIATE MEDICAL CARE IF:   · Pain is severe and not relieved by medication.  · Pain or problems with vision last more than 48 hours.  · Exposure to light is unavoidable and you need extra protection for your eyes.  MAKE SURE YOU:   · Understand these instructions.  · Will watch your condition.  · Will get help right away if you are not doing well or get worse.  Document Released: 12/18/2006 Document Revised: 03/11/2013 Document Reviewed: 07/24/2009  [employer]® Patient Information ©2014 [employer]

## 2018-02-25 NOTE — ED PROVIDER NOTES
"ED Provider Note    CHIEF COMPLAINT  Pain in eyes    HPI  Marly Tabares is a 27 y.o. female brought in by ambulance who presents complaining of pain in her eyes. She was fine this morning, but her eyes started hurting her this afternoon. They were drinking last night, but nothing out of the ordinary. She reports falling asleep with her contact lenses in place which is unusual for her. She has since taken them out. Her eyes are very watery. It feels like there is something underneath her eyelids. Contrary to nursing note, she has no headache. She has no nausea or vomiting. No fever or recent illness. There is no other complaint.    PAST MEDICAL HISTORY  Past Medical History:   Diagnosis Date   • Asthma        FAMILY HISTORY  No family history on file.    SOCIAL HISTORY  Social History   Substance Use Topics   • Smoking status: Current Every Day Smoker   • Smokeless tobacco: Never Used   • Alcohol use Yes      Comment: socially     Her boyfriend is at the bedside.    SURGICAL HISTORY  No past surgical history on file.    CURRENT MEDICATIONS    I have reviewed the nurses notes and/or the list brought with the patient.    ALLERGIES  No Known Allergies    REVIEW OF SYSTEMS  See HPI for further details. Review of systems as above, otherwise all other systems are negative.     PHYSICAL EXAM  VITAL SIGNS: /82   Pulse 96   Temp 36.5 °C (97.7 °F) (Temporal)   Resp 14   Ht 1.727 m (5' 8\")   Wt 56.1 kg (123 lb 10.9 oz)   SpO2 98%   BMI 18.81 kg/m²   Constitutional: Lying on the bed, holding her eyes. She will not open them spontaneously.  HENT: Mucus membranes moist.  Oropharynx is clear.  Eyes: The lids are normal. She has complete resolution of her symptoms with instillation of proparacaine eyedrops. Pupils equally round.  Anterior chambers appear normal. No scleral icterus. Extraocular motion is intact. There is no foreign body seen, lids are everted. There is diffuse fluorescein uptake across both " corneas.  Cardiovascular: Regular heart rate and rhythm.  No murmurs, rubs, nor gallop appreciated.   Thorax & Lungs: Chest is nontender.  Lungs are clear to auscultation with good air movement bilaterally.  No wheeze, rhonchi, nor rales.   Abdomen: Soft, with no tenderness, rebound nor guarding.  No mass, pulsatile mass, nor hepatosplenomegaly appreciated.  Skin: No purpura nor petechia noted.  Extremities/Musculoskeletal: No sign of trauma.    Neurologic: Alert & oriented.  Strength and sensation is intact all around.  Gait is normal.    MEDICAL RECORD  I have reviewed patient's medical record and pertinent results are listed above.    COURSE & MEDICAL DECISION MAKING  I have reviewed any medical record information, laboratory studies and radiographic results as noted above.  Patient presents with eye discomfort. I suspect that she has a keratitis from her contacts being in overnight. I see no evidence of a corneal ulcer. There is no foreign body. The anterior chambers are normal. For now, recommended ibuprofen for discomfort. We will put her on antibiotics, she is given the option of either ciprofloxacin drops or erythromycin ointment. She is given instructions on keratitis; actually UV keratitis is that is the closest thing we have in terms of aftercare instructions. She is advised to follow-up with ophthalmology if she is not back to normal in 36 -48 hours. If she is unable to follow up with a specialist she is return to the ER. Sooner for any turn for the worse.    Addendum: I called and talked with the patient. I had neglected to tell her that I did not want her wearing contacts for the next 2 weeks. She points out that they were only vanity lenses, and this will not be not a problem.    FINAL IMPRESSION  1. Keratitis secondary to contact lens           This dictation was created using voice recognition software.    Electronically signed by: Eleazar Sheppard, 2/24/2018 4:15 PM

## 2018-02-25 NOTE — ED NOTES
Patient dc'd to home w/ significant other. Patient alert and oriented x 4. Patient ambulatory w/ steady gait. Patient verbalizes understanding of discharge instructions, follow up care, lifestyle changes, and prescriptions x 2. Patient denies questions upon discharge.

## 2018-06-30 ENCOUNTER — APPOINTMENT (OUTPATIENT)
Dept: RADIOLOGY | Facility: MEDICAL CENTER | Age: 28
End: 2018-06-30
Attending: EMERGENCY MEDICINE
Payer: MEDICAID

## 2018-06-30 ENCOUNTER — HOSPITAL ENCOUNTER (EMERGENCY)
Facility: MEDICAL CENTER | Age: 28
End: 2018-06-30
Attending: EMERGENCY MEDICINE
Payer: MEDICAID

## 2018-06-30 VITALS
WEIGHT: 124 LBS | DIASTOLIC BLOOD PRESSURE: 74 MMHG | BODY MASS INDEX: 18.37 KG/M2 | TEMPERATURE: 98.6 F | RESPIRATION RATE: 18 BRPM | HEART RATE: 78 BPM | OXYGEN SATURATION: 98 % | SYSTOLIC BLOOD PRESSURE: 120 MMHG | HEIGHT: 69 IN

## 2018-06-30 DIAGNOSIS — S82.001A CLOSED DISPLACED FRACTURE OF RIGHT PATELLA, UNSPECIFIED FRACTURE MORPHOLOGY, INITIAL ENCOUNTER: ICD-10-CM

## 2018-06-30 PROCEDURE — A9270 NON-COVERED ITEM OR SERVICE: HCPCS | Performed by: EMERGENCY MEDICINE

## 2018-06-30 PROCEDURE — 99284 EMERGENCY DEPT VISIT MOD MDM: CPT

## 2018-06-30 PROCEDURE — 73560 X-RAY EXAM OF KNEE 1 OR 2: CPT | Mod: RT

## 2018-06-30 PROCEDURE — 700102 HCHG RX REV CODE 250 W/ 637 OVERRIDE(OP): Performed by: EMERGENCY MEDICINE

## 2018-06-30 RX ORDER — HYDROCODONE BITARTRATE AND ACETAMINOPHEN 5; 325 MG/1; MG/1
1-2 TABLET ORAL EVERY 6 HOURS PRN
Qty: 20 TAB | Refills: 0 | Status: SHIPPED | OUTPATIENT
Start: 2018-06-30 | End: 2018-07-05

## 2018-06-30 RX ORDER — HYDROCODONE BITARTRATE AND ACETAMINOPHEN 5; 325 MG/1; MG/1
1 TABLET ORAL ONCE
Status: COMPLETED | OUTPATIENT
Start: 2018-06-30 | End: 2018-06-30

## 2018-06-30 RX ADMIN — HYDROCODONE BITARTRATE AND ACETAMINOPHEN 1 TABLET: 5; 325 TABLET ORAL at 12:48

## 2018-06-30 ASSESSMENT — PAIN SCALES - GENERAL: PAINLEVEL_OUTOF10: 6

## 2018-06-30 NOTE — ED TRIAGE NOTES
Chief Complaint   Patient presents with   • Knee Pain     right     Pt bib ems, pt jumped over a wall and landed on her knee, est 6ft fall. No loc. Pt up to date poc.   Pt unable to bend her knee.   ERP at bedside

## 2018-06-30 NOTE — DISCHARGE INSTRUCTIONS
Patellar Fracture, Adult  A patellar fracture is a break in the kneecap (patella). The patella is located on the front of the knee. A patellar fracture may make it difficult to walk.  What are the causes?  This condition may be caused by:  · A fall or a hard, direct hit (blow) to the knee.  · A very hard and strong bending of the knee.  What increases the risk?  The following factors make you more likely to experience a patellar fracture:  · Playing contact sports or motor sports, especially sports that involve a lot of jumping.  · Having bone abnormalities or diseases of the bone, such as osteoporosis or a bone tumor.  · Having poor strength and flexibility.  · Having metabolism disorders, hormone problems, or nutrition deficiencies and disorders, such as anorexia or bulimia.  What are the signs or symptoms?  Symptoms of this condition include:  · Tender and swollen knee.  · Pain when moving the knee, especially when straightening it.  · Difficulty walking or using the knee to support body weight (bearing weight).  · Misshapen knee, as if a bone is out of place.  How is this diagnosed?  This condition is diagnosed based on:  · Your symptoms and medical history.  · A physical exam.  · X-rays.  How is this treated?  Treatment for this condition depends on the type of fracture that you have:  · If your patella is still in the right position after the fracture and you can still straighten your leg, you may need to wear a splint or cast for 4-6 weeks.  · If your patella is broken into multiple pieces but you are able to straighten your leg, you can usually be treated with a splint or cast for 4-6 weeks. In some cases, the patella may need to be removed before the cast is applied.  · If you cannot straighten your leg after a patellar fracture, you will need to have surgery to hold the patella together until it heals. After surgery, a splint or cast will be applied for 4-6 weeks.  · You may be prescribed medicine to help  relieve pain or prevent infection.  Follow these instructions at home:  If you have a splint:  · Wear the splint as told by your health care provider. Remove it only as told by your health care provider.  · Loosen the splint if your toes tingle, become numb, or turn cold and blue.  · Keep the splint clean.  · If the splint is not waterproof:  ¨ Do not let it get wet.  ¨ Cover it with a watertight covering when you take a bath or a shower.  If you have a cast:  · Do not stick anything inside the cast to scratch your skin. Doing that increases your risk of infection.  · Check the skin around the cast every day. Tell your health care provider about any concerns.  · You may put lotion on dry skin around the edges of the cast. Do not put lotion on the skin underneath the cast.  · Keep the cast clean.  · If the cast is not waterproof:  ¨ Do not let it get wet.  ¨ Cover it with a watertight covering when you take a bath or a shower.  Managing pain, stiffness, and swelling  · If directed, put ice on the injured area:  ¨ If you have a removable splint, remove it as told by your health care provider.  ¨ Put ice in a plastic bag.  ¨ Place a towel between your skin and the bag or between your cast and the bag.  ¨ Leave the ice on for 20 minutes, 2-3 times a day.  · Move your toes often to avoid stiffness and to lessen swelling.  · Raise (elevate) the injured area above the level of your heart while you are sitting or lying down.  Medicines  · Take over-the-counter and prescription medicines only as told by your health care provider.  · If you were prescribed an antibiotic medicine, use it as told by your health care provider. Do not stop taking the antibiotic even if you start to feel better.  Activity  · Return to your normal activities as told by your health care provider. Ask your health care provider what activities are safe for you.  · Do exercises as told by your health care provider.  · Ask your health care provider when  it is safe to drive if you have a splint or cast on your leg.  · Do not drive or use heavy machinery while taking prescription pain medicine.  General instructions  · Do not use the injured limb to support your body weight until your health care provider says that you can. Use crutches as told by your health care provider.  · Do not use any products that contain nicotine or tobacco, such as cigarettes and e-cigarettes. These can delay bone healing. If you need help quitting, ask your health care provider.  · Keep all follow-up visits as told by your health care provider. This is important.  Contact a health care provider if:  · You have symptoms that get worse or do not get better after 2 weeks of treatment.  · You have severe, persistent pain.  Get help right away if:  · You have redness, swelling, or increasing pain in your knee.  · You have a fever.  · You have blue or gray skin below the fracture site or in the toes.  · You have numbness or loss of feeling below the fracture site.  This information is not intended to replace advice given to you by your health care provider. Make sure you discuss any questions you have with your health care provider.  Document Released: 09/15/2004 Document Revised: 09/29/2017 Document Reviewed: 09/29/2017  [employer] Interactive Patient Education © 2017 [employer]

## 2018-06-30 NOTE — ED NOTES
VSS.  Discharge instructions and prescriptions x 1 given- verbalizes understanding.   Assisted to lobby in w/c
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
## 2018-07-01 ENCOUNTER — PATIENT OUTREACH (OUTPATIENT)
Dept: HEALTH INFORMATION MANAGEMENT | Facility: OTHER | Age: 28
End: 2018-07-01

## 2018-09-13 ENCOUNTER — HOSPITAL ENCOUNTER (EMERGENCY)
Facility: MEDICAL CENTER | Age: 28
End: 2018-09-13
Attending: EMERGENCY MEDICINE
Payer: MEDICAID

## 2018-09-13 VITALS
HEIGHT: 69 IN | TEMPERATURE: 98.4 F | DIASTOLIC BLOOD PRESSURE: 68 MMHG | OXYGEN SATURATION: 99 % | WEIGHT: 127.21 LBS | SYSTOLIC BLOOD PRESSURE: 106 MMHG | RESPIRATION RATE: 16 BRPM | BODY MASS INDEX: 18.84 KG/M2 | HEART RATE: 88 BPM

## 2018-09-13 DIAGNOSIS — R30.0 DYSURIA: ICD-10-CM

## 2018-09-13 LAB
APPEARANCE UR: CLEAR
BACTERIA GENITAL QL WET PREP: NORMAL
C TRACH DNA SPEC QL NAA+PROBE: NEGATIVE
COLOR UR AUTO: YELLOW
GLUCOSE UR QL STRIP.AUTO: NEGATIVE MG/DL
HCG UR QL: NEGATIVE
KETONES UR QL STRIP.AUTO: NEGATIVE MG/DL
LEUKOCYTE ESTERASE UR QL STRIP.AUTO: NEGATIVE
N GONORRHOEA DNA SPEC QL NAA+PROBE: NEGATIVE
NITRITE UR QL STRIP.AUTO: NEGATIVE
PH UR STRIP.AUTO: 6 [PH]
PROT UR QL STRIP: NEGATIVE MG/DL
RBC UR QL AUTO: NEGATIVE
SIGNIFICANT IND 70042: NORMAL
SITE SITE: NORMAL
SOURCE SOURCE: NORMAL
SP GR UR: 1.02
SPECIMEN SOURCE: NORMAL

## 2018-09-13 PROCEDURE — 81025 URINE PREGNANCY TEST: CPT

## 2018-09-13 PROCEDURE — 99284 EMERGENCY DEPT VISIT MOD MDM: CPT

## 2018-09-13 PROCEDURE — 81002 URINALYSIS NONAUTO W/O SCOPE: CPT

## 2018-09-13 PROCEDURE — 87491 CHLMYD TRACH DNA AMP PROBE: CPT

## 2018-09-13 PROCEDURE — 87591 N.GONORRHOEAE DNA AMP PROB: CPT

## 2018-09-13 RX ORDER — CEPHALEXIN 500 MG/1
500 CAPSULE ORAL 4 TIMES DAILY
Qty: 20 CAP | Refills: 0 | Status: SHIPPED | OUTPATIENT
Start: 2018-09-13 | End: 2018-09-18

## 2018-09-13 NOTE — ED TRIAGE NOTES
26 y/o female ambulate to triage   Chief Complaint   Patient presents with   • Abdominal Pain   • Painful Urination   • UTI     Pt states no relief with OTC

## 2018-09-13 NOTE — ED PROVIDER NOTES
"ED Provider Note    CHIEF COMPLAINT  Chief Complaint   Patient presents with   • Abdominal Pain   • Painful Urination   • UTI       HPI  Marly Tabares is a 27 y.o. female who presents for evaluation of urinary symptoms.  She states she has a burning sensation.  It sounds as if she is having urgency and increased frequency.  She has had no fevers or vomiting.  When she has had symptoms like this in the past she states she has had a bladder infection.    REVIEW OF SYSTEMS  See HPI for further details. All other systems negative.    PAST MEDICAL HISTORY  Past Medical History:   Diagnosis Date   • Asthma        FAMILY HISTORY  History reviewed. No pertinent family history.    SOCIAL HISTORY  Social History     Social History   • Marital status: Single     Spouse name: N/A   • Number of children: N/A   • Years of education: N/A     Social History Main Topics   • Smoking status: Current Every Day Smoker   • Smokeless tobacco: Never Used   • Alcohol use Yes      Comment: socially   • Drug use: No   • Sexual activity: Not on file     Other Topics Concern   • Not on file     Social History Narrative   • No narrative on file       SURGICAL HISTORY  History reviewed. No pertinent surgical history.    CURRENT MEDICATIONS  Home Medications    **Home medications have not yet been reviewed for this encounter**         ALLERGIES  No Known Allergies    PHYSICAL EXAM  VITAL SIGNS: /62   Pulse 82   Temp 36.9 °C (98.4 °F)   Resp 14   Ht 1.753 m (5' 9\")   Wt 57.7 kg (127 lb 3.3 oz)   SpO2 99%   BMI 18.78 kg/m²   Constitutional: Well developed, Well nourished, No acute distress, Non-toxic appearance.   HENT: Normocephalic, Atraumatic.  Eyes:  EOMI, Conjunctiva normal, No discharge.   Cardiovascular: Normal heart rate.   Thorax & Lungs: No respiratory distress.   Abdomen: Soft, No tenderness, No masses, no guarding and no rebound.   Genital  : Normal external genitalia.  Small amount of whitish discharge from the cervix.  " No tenderness.  Skin: Warm, Dry.  Musculoskeletal: Good range of motion in all major joints.    Neurologic: Awake and alert.      COURSE & MEDICAL DECISION MAKING  Pertinent Labs & Imaging studies reviewed. (See chart for details)  Is a 27-year-old here for evaluation of dysuria.  She is afebrile.  Her physical exam is unremarkable.  Urine is obtained and is negative for evidence of infection.  Her urine pregnancy test is negative.  Wet mount has come back negative as well.  I discussed the results of all the studies with the patient.  At this time I will treat her empirically for her dysuria.  I will refer her for follow-up.  She is given a discharge instruction sheet on dysuria.    FINAL IMPRESSION  1.  Dysuria  2.   3.         Electronically signed by: Alex Turner, 9/13/2018 10:35 AM

## 2019-04-10 ENCOUNTER — HOSPITAL ENCOUNTER (EMERGENCY)
Facility: MEDICAL CENTER | Age: 29
End: 2019-04-10
Attending: EMERGENCY MEDICINE
Payer: COMMERCIAL

## 2019-04-10 VITALS
HEART RATE: 71 BPM | SYSTOLIC BLOOD PRESSURE: 127 MMHG | TEMPERATURE: 98 F | WEIGHT: 143.08 LBS | BODY MASS INDEX: 21.13 KG/M2 | DIASTOLIC BLOOD PRESSURE: 79 MMHG | RESPIRATION RATE: 18 BRPM | OXYGEN SATURATION: 96 %

## 2019-04-10 DIAGNOSIS — J02.9 PHARYNGITIS, UNSPECIFIED ETIOLOGY: ICD-10-CM

## 2019-04-10 LAB — S PYO DNA SPEC NAA+PROBE: NOT DETECTED

## 2019-04-10 PROCEDURE — 99283 EMERGENCY DEPT VISIT LOW MDM: CPT

## 2019-04-10 PROCEDURE — 87651 STREP A DNA AMP PROBE: CPT

## 2019-04-10 NOTE — ED TRIAGE NOTES
".  Chief Complaint   Patient presents with   • Sore Throat     X a couple days, pt states \"everybody at work says it's probably from inhaling fiberglass at work\" but denies any specific instance she inhaled anything, pt states she was recently sick w/ head cold and it has moved down to throat     Patient ambulatory to triage for above complaints; A&O X4; NAD observed.   Patient placed in lobby and educated to notify staff of new or worsening symptoms.     "

## 2019-04-10 NOTE — ED PROVIDER NOTES
ED Provider Note    Chief Complaint:   Sore throat    HPI:  Marly Tabares is a 28 y.o. female who presents with chief complaint of shortness of breath.  Symptoms began to 3 days ago, initially with generalized upper respiratory symptoms including cough, nasal congestion, and mild headaches.  Since that time, all other symptoms have completely resolved excepting her sore throat.  She describes generalized throat pain, exacerbated by swallowing, not alleviated with NyQuil.  She has no difficulty swallowing, no drooling, no shortness of breath.  She does have past medical history of asthma but states that she is not having any symptoms of asthma exacerbation at this time.    She has had no associated fevers.  She has no history of impaired immunity, no hyperglycemia.    Review of Systems:  See HPI for pertinent positives and negatives.    Past Medical History:   has a past medical history of Asthma.    Social History:  Social History     Social History Main Topics   • Smoking status: Current Some Day Smoker   • Smokeless tobacco: Never Used   • Alcohol use Yes      Comment: socially   • Drug use: No   • Sexual activity: Not on file       Surgical History:  patient denies any surgical history    Current Medications:  Home Medications     Reviewed by Kimberly Carrillo R.N. (Registered Nurse) on 04/10/19 at 0743  Med List Status: <None>   Medication Last Dose Status        Patient Naveen Taking any Medications                       Allergies:  No Known Allergies    Physical Exam:  Vital Signs: /79   Pulse 71   Temp 36.7 °C (98 °F) (Temporal)   Resp 18   Wt 64.9 kg (143 lb 1.3 oz)   SpO2 96%   BMI 21.13 kg/m²   Constitutional: Alert, no acute distress  HENT: Moist mucus membranes, mildly inflamed posterior pharynx, no intraoral lesions  Eyes: Normal conjunctiva  Neck: Supple, normal range of motion, no lymphadenopathy  Cardiovascular: Extremities are warm and well perfused, no murmur appreciated, normal cardiac  auscultation  Pulmonary: No respiratory distress, normal work of breathing, no accessory muscule usage, breath sounds clear and equal bilaterally, no wheezing  Psychiatric: Normal and appropriate mood and affect    Patient has had no recent visits for similar symptoms.    Labs:  Labs Reviewed   GROUP A STREP BY PCR     MDM:  Patient presents with upper respiratory symptoms and sore throat.  On physical exam she has no evidence of peritonsillar abscess, no evidence of retropharyngeal abscess, she does have mild inflammation of the posterior pharynx.  Symptoms are consistent with a viral illness or possibly strep pharyngitis.  Rapid strep PCR is negative.  While awaiting lab results the patient left, declining to wait for alternate follow-up instructions or return precautions.  As rapid strep is negative, this is likely viral with no further treatment necessary at this time.  She is discharged home in stable condition.    Blood pressure today is greater than 120/80, patient is instructed to follow up with primary care provider for blood pressure recheck.    Disposition:  Discharged home in stable condition    Final Impression:  1. Pharyngitis, unspecified etiology        Electronically signed by: Sondra Cesar, 4/10/2019 5:42 PM

## 2019-04-10 NOTE — ED NOTES
"Pt refusing to wait for results. \"I have to get back to [place]. Just call me\". Left prior to signing AMA form. Pt ambulatory out of ED.  "

## 2019-07-12 ENCOUNTER — APPOINTMENT (OUTPATIENT)
Dept: RADIOLOGY | Facility: MEDICAL CENTER | Age: 29
End: 2019-07-12
Payer: COMMERCIAL

## 2019-07-12 ENCOUNTER — APPOINTMENT (OUTPATIENT)
Dept: RADIOLOGY | Facility: MEDICAL CENTER | Age: 29
End: 2019-07-12
Attending: EMERGENCY MEDICINE
Payer: COMMERCIAL

## 2019-07-12 ENCOUNTER — HOSPITAL ENCOUNTER (EMERGENCY)
Facility: MEDICAL CENTER | Age: 29
End: 2019-07-12
Attending: EMERGENCY MEDICINE
Payer: COMMERCIAL

## 2019-07-12 VITALS
TEMPERATURE: 98.8 F | RESPIRATION RATE: 17 BRPM | HEIGHT: 68 IN | HEART RATE: 78 BPM | DIASTOLIC BLOOD PRESSURE: 66 MMHG | BODY MASS INDEX: 19.7 KG/M2 | WEIGHT: 130 LBS | SYSTOLIC BLOOD PRESSURE: 118 MMHG | OXYGEN SATURATION: 100 %

## 2019-07-12 DIAGNOSIS — S71.112A STAB WOUND OF LEFT THIGH, INITIAL ENCOUNTER: ICD-10-CM

## 2019-07-12 PROCEDURE — 304999 HCHG REPAIR-SIMPLE/INTERMED LEVEL 1

## 2019-07-12 PROCEDURE — 73551 X-RAY EXAM OF FEMUR 1: CPT | Mod: LT

## 2019-07-12 PROCEDURE — 305949 HCHG RED TRAUMA ACT PRE-NOTIFY NO CC

## 2019-07-12 PROCEDURE — 303485 HCHG DRESSING MEDIUM

## 2019-07-12 PROCEDURE — 99284 EMERGENCY DEPT VISIT MOD MDM: CPT

## 2019-07-12 PROCEDURE — 304217 HCHG IRRIGATION SYSTEM

## 2019-07-12 PROCEDURE — 305308 HCHG STAPLER,SKIN,DISP.

## 2019-07-12 ASSESSMENT — LIFESTYLE VARIABLES: DO YOU DRINK ALCOHOL: NO
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
## 2022-07-13 ENCOUNTER — HOSPITAL ENCOUNTER (EMERGENCY)
Facility: MEDICAL CENTER | Age: 32
End: 2022-07-13
Attending: EMERGENCY MEDICINE
Payer: COMMERCIAL

## 2022-07-13 ENCOUNTER — PHARMACY VISIT (OUTPATIENT)
Dept: PHARMACY | Facility: MEDICAL CENTER | Age: 32
End: 2022-07-13
Payer: COMMERCIAL

## 2022-07-13 ENCOUNTER — APPOINTMENT (OUTPATIENT)
Dept: RADIOLOGY | Facility: MEDICAL CENTER | Age: 32
End: 2022-07-13
Attending: EMERGENCY MEDICINE
Payer: COMMERCIAL

## 2022-07-13 VITALS
HEART RATE: 75 BPM | SYSTOLIC BLOOD PRESSURE: 134 MMHG | RESPIRATION RATE: 16 BRPM | BODY MASS INDEX: 21.55 KG/M2 | OXYGEN SATURATION: 98 % | TEMPERATURE: 98 F | HEIGHT: 68 IN | DIASTOLIC BLOOD PRESSURE: 85 MMHG | WEIGHT: 142.2 LBS

## 2022-07-13 DIAGNOSIS — N89.8 VAGINA ITCHING: ICD-10-CM

## 2022-07-13 DIAGNOSIS — N76.0 BV (BACTERIAL VAGINOSIS): ICD-10-CM

## 2022-07-13 DIAGNOSIS — Z33.1 INCIDENTAL PREGNANCY: ICD-10-CM

## 2022-07-13 DIAGNOSIS — B96.89 BV (BACTERIAL VAGINOSIS): ICD-10-CM

## 2022-07-13 LAB
APPEARANCE UR: CLEAR
B-HCG SERPL-ACNC: 4029 MIU/ML (ref 0–5)
BILIRUB UR QL STRIP.AUTO: NEGATIVE
C TRACH DNA GENITAL QL NAA+PROBE: NEGATIVE
CANDIDA DNA VAG QL PROBE+SIG AMP: NEGATIVE
COLOR UR: YELLOW
G VAGINALIS DNA VAG QL PROBE+SIG AMP: POSITIVE
GLUCOSE UR STRIP.AUTO-MCNC: NEGATIVE MG/DL
HCG UR QL: POSITIVE
HIV 1+2 AB+HIV1 P24 AG SERPL QL IA: NORMAL
KETONES UR STRIP.AUTO-MCNC: NEGATIVE MG/DL
LEUKOCYTE ESTERASE UR QL STRIP.AUTO: NEGATIVE
MICRO URNS: NORMAL
N GONORRHOEA DNA GENITAL QL NAA+PROBE: NEGATIVE
NITRITE UR QL STRIP.AUTO: NEGATIVE
PH UR STRIP.AUTO: 6.5 [PH] (ref 5–8)
PROT UR QL STRIP: NEGATIVE MG/DL
RBC UR QL AUTO: NEGATIVE
SP GR UR STRIP.AUTO: 1.02
SPECIMEN SOURCE: NORMAL
T PALLIDUM AB SER QL IA: NORMAL
T VAGINALIS DNA VAG QL PROBE+SIG AMP: NEGATIVE
UROBILINOGEN UR STRIP.AUTO-MCNC: 1 MG/DL

## 2022-07-13 PROCEDURE — 81003 URINALYSIS AUTO W/O SCOPE: CPT

## 2022-07-13 PROCEDURE — 76801 OB US < 14 WKS SINGLE FETUS: CPT

## 2022-07-13 PROCEDURE — 81025 URINE PREGNANCY TEST: CPT

## 2022-07-13 PROCEDURE — 87491 CHLMYD TRACH DNA AMP PROBE: CPT

## 2022-07-13 PROCEDURE — RXMED WILLOW AMBULATORY MEDICATION CHARGE: Performed by: EMERGENCY MEDICINE

## 2022-07-13 PROCEDURE — 36415 COLL VENOUS BLD VENIPUNCTURE: CPT

## 2022-07-13 PROCEDURE — 86780 TREPONEMA PALLIDUM: CPT

## 2022-07-13 PROCEDURE — 84702 CHORIONIC GONADOTROPIN TEST: CPT

## 2022-07-13 PROCEDURE — 87389 HIV-1 AG W/HIV-1&-2 AB AG IA: CPT

## 2022-07-13 PROCEDURE — 87480 CANDIDA DNA DIR PROBE: CPT

## 2022-07-13 PROCEDURE — 87510 GARDNER VAG DNA DIR PROBE: CPT

## 2022-07-13 PROCEDURE — 87591 N.GONORRHOEAE DNA AMP PROB: CPT

## 2022-07-13 PROCEDURE — 99284 EMERGENCY DEPT VISIT MOD MDM: CPT

## 2022-07-13 PROCEDURE — 87660 TRICHOMONAS VAGIN DIR PROBE: CPT

## 2022-07-13 RX ORDER — METRONIDAZOLE 500 MG/1
500 TABLET ORAL 2 TIMES DAILY
Qty: 14 TABLET | Refills: 0 | Status: SHIPPED | OUTPATIENT
Start: 2022-07-13 | End: 2022-07-20

## 2022-07-13 ASSESSMENT — FIBROSIS 4 INDEX: FIB4 SCORE: 0.61

## 2022-07-13 NOTE — ED TRIAGE NOTES
"Chief Complaint   Patient presents with   • Vaginal Itching     Patient reports foul smelling odor from vagina x a few weeks. Patient reports it has not improved and wants to get seen for vaginitis       30 yo female to triage for above complaint. Patient reports she would like to get tested for STI's as well. Protocol ordered.    Pt is alert and oriented, speaking in full sentences, follows commands and responds appropriately to questions.     Patient placed back in lobby and educated on triage process. Asked to inform RN of any changes.    /54   Pulse 62   Temp 36.5 °C (97.7 °F) (Temporal)   Resp 18   Ht 1.727 m (5' 8\")   Wt 64.5 kg (142 lb 3.2 oz)   SpO2 98%   BMI 21.62 kg/m²     "

## 2022-07-13 NOTE — ED PROVIDER NOTES
ED Provider Note    Scribed for Mayito Iyer M.D. by Macho Srivastava. 7/13/2022  1:59 PM    Primary care provider: Pcp Pt States None  Means of arrival: Walk-in  History obtained from: Patient  History limited by: None     CHIEF COMPLAINT  Chief Complaint   Patient presents with   • Vaginal Itching     Patient reports foul smelling odor from vagina x a few weeks. Patient reports it has not improved and wants to get seen for vaginitis       HPI  Marly Tabares is a 31 y.o. female who presents to the Emergency Department for evaluation of vaginal itching, onset a few weeks ago. She states that 3 weeks ago she began having sex with a new partner and following this is when she noticed the itching. She also developed associated symptoms of foul smelling discharge. She denies any dysuria. She states that she has a history of similar symptoms. She was diagnosed with vaginitis at this time and was prescribed antibiotics. She has had yeast infections in the past and notes that this does not feel like one. She denies any chance of pregnancy. There are no known alleviating or exacerbating factors.     REVIEW OF SYSTEMS  Pertinent positives include: vaginal itching and foul smelling discharge. Pertinent negatives include: dysuria. See history of present illness. All other systems are negative.     PAST MEDICAL HISTORY   has a past medical history of Asthma.    SURGICAL HISTORY   has a past surgical history that includes gyn surgery (2012).    SOCIAL HISTORY  Social History     Tobacco Use   • Smoking status: Current Every Day Smoker     Years: 2.00   • Smokeless tobacco: Never Used   Substance Use Topics   • Alcohol use: Yes     Comment: socially   • Drug use: No      Social History     Substance and Sexual Activity   Drug Use No       FAMILY HISTORY  None noted.     CURRENT MEDICATIONS  Home Medications     Reviewed by Bethany Clayton R.N. (Registered Nurse) on 07/13/22 at 1221  Med List Status: Partial   Medication  "Last Dose Status        Patient Naveen Taking any Medications                       ALLERGIES  No Known Allergies    PHYSICAL EXAM  VITAL SIGNS: /54   Pulse 62   Temp 36.5 °C (97.7 °F) (Temporal)   Resp 18   Ht 1.727 m (5' 8\")   Wt 64.5 kg (142 lb 3.2 oz)   SpO2 98%   BMI 21.62 kg/m²     Constitutional: Alert in no apparent distress.  HENT: No signs of trauma, Bilateral external ears normal, Nose normal. Uvula midline.   Eyes: Pupils are equal and reactive, Conjunctiva normal, Non-icteric.   Neck: Normal range of motion, No tenderness, Supple, No stridor.   Lymphatic: No lymphadenopathy noted.   Cardiovascular: Regular rate and rhythm, no murmurs.   Thorax & Lungs: Normal breath sounds, No respiratory distress, No wheezing, No chest tenderness.   Abdomen:  Soft, No tenderness, No peritoneal signs, No masses, No pulsatile masses.   Skin: Warm, Dry, No erythema, No rash.   Back: No bony tenderness, No CVA tenderness.   Extremities: Intact distal pulses, No edema, No tenderness, No cyanosis.  Musculoskeletal: Good range of motion in all major joints. No tenderness to palpation or major deformities noted.   Neurologic: Alert , Normal motor function, Normal sensory function, No focal deficits noted.   Psychiatric: Affect normal, Judgment normal, Mood normal.   Pelvic:   RN chaperone present for entire exam.   External genitalia unremarkable.  Speculum exam with normal appearing whitish vaginal discharge.  Vaginal wall mucosa is unremarkable.  Cervix visualized and is unremarkable (closed in appearance without any protruding material).  Bimanual exam without cervical motion tenderness, adnexal tenderness or any masses appreciated.  No pooling of blood in vault.       DIAGNOSTIC STUDIES / PROCEDURES    LABS  Labs Reviewed   HCG QUALITATIVE UR - Abnormal; Notable for the following components:       Result Value    Beta-Hcg Urine Positive (*)     All other components within normal limits   VAGINAL PATHOGENS DNA " PANEL - Abnormal; Notable for the following components:    Gardnerella vaginalis DNA Probe POSITIVE (*)     All other components within normal limits   HCG QUANTITATIVE - Abnormal; Notable for the following components:    Bhcg 4029.0 (*)     All other components within normal limits   T.PALLIDUM AB EIA   HIV AG/AB COMBO ASSAY SCREENING   URINALYSIS   CHLAMYDIA/GC, PCR (GENITAL/ANAL SWAB)      All labs reviewed by me.      RADIOLOGY  US-OB 1ST TRIMESTER WITH TRANSVAGINAL (COMBO)   Final Result         1. Intrauterine gestational sac of approximately 5 weeks 2 days size with normal yolk sac but no embryo identified. The findings could relate to probable early intrauterine pregnancy.      Continued follow-up of serum beta HCG levels and repeat ultrasound is recommended.      2. A 4.5 cm hemorrhagic cyst in the left ovary.        The radiologist's interpretation of all radiological studies have been reviewed by me.    COURSE & MEDICAL DECISION MAKING  Nursing notes, VS, PMSFHx reviewed in chart.    31 y.o. female p/w chief complaint of vaginal itching.    1:59 PM Patient seen and examined at bedside. Ordered Urinalysis, Vaginal pathogens DNA panel, Beta-HCG qualitative urine, Chlamydia/GC PCR, T. Pallidum AB EIA, and HIV AG/AB to evaluate the patient's symptoms.     I verified that the patient was wearing a mask and I was wearing appropriate PPE every time I entered the room. The patient's mask was on the patient at all times during my encounter except for a brief view of the oropharynx.     The differential diagnoses include but are not limited to:     #Vaginal Itching and Foul smelling discharge  -Bacterial vaginosis, Possible STI  Urinalysis unremarkable.   Beta-HCG qualitative urine remarkable for pregnancy.   Vaginal pathogens DNA panel remarkable for bacterial vaginosis.     2:08 PM Pelvic exam completed by myself in the presence of a chaperone at this time.     3:06 PM Ordered Beta-HCG quantitative serum and  US-OB 1st trimester with transvaginal for further evaluation.     4:35 PM - I reevaluated the patient at bedside. I discussed the patient's diagnostic study results which show the patient is pregnant and has bacterial vaginosis. I discussed plan for discharge and follow up as outlined below, including follow-up with an OB/GYN and prescribing flagyl for the patient's BV. The patient is stable for discharge at this time and will return for any new or worsening symptoms. Patient verbalizes understanding and support with my plan for discharge.     I discussed with patient her elevated blood pressure today and my concerns given new pregnancy and elevated blood pressure reading.  Patient agrees to follow-up with GYN and will call to schedule follow-up appointment.  Patient denies any vaginal bleeding.    The patient will return for new or worsening symptoms and is stable at the time of discharge.      DISPOSITION:  Patient will be discharged home in stable condition.    FOLLOW UP:  [facility], Emergency Dept  [address] [id number] [phone]    If symptoms worsen    Jefferson Garcai M.D.  [address] [place] [id number] [phone]      call to schedule GYN follow up      OUTPATIENT MEDICATIONS:  Discharge Medication List as of 7/13/2022  4:41 PM      START taking these medications    Details   metroNIDAZOLE (FLAGYL) 500 MG Tab Take 1 Tablet by mouth 2 times a day for 7 days., Disp-14 Tablet, R-0, Normal               FINAL IMPRESSION  1. Vagina itching    2. Incidental pregnancy    3. BV (bacterial vaginosis)          IMacho (Alex), am scribing for, and in the presence of, Mayito Iyer M.D..    Electronically signed by: Macho Srivastava (Alex), 7/13/2022    IMayito M.D. personally performed the services described in this documentation, as scribed by Macho Srivastava in my presence, and it is both accurate and complete.    The note accurately  reflects work and decisions made by me.  Mayito Iyer M.D.  7/13/2022  8:18 PM

## 2022-07-13 NOTE — ED NOTES
Patient discharged per order. Oral and written discharge instructions reviewed. IV removed. Medications sent to home pharmacy. New medications reviewed. All belongings accounted for and taken with patient. Questions answered, and patient agrees with discharge plan. Patient escorted to lobby. Encouraged to follow up with OBGYN

## 2022-07-13 NOTE — DISCHARGE INSTRUCTIONS
Please discuss the following findings with your regular doctor:  US-OB 1ST TRIMESTER WITH TRANSVAGINAL (COMBO)   Final Result         1. Intrauterine gestational sac of approximately 5 weeks 2 days size with normal yolk sac but no embryo identified. The findings could relate to probable early intrauterine pregnancy.      Continued follow-up of serum beta HCG levels and repeat ultrasound is recommended.      2. A 4.5 cm hemorrhagic cyst in the left ovary.        Labs Reviewed   HCG QUALITATIVE UR - Abnormal; Notable for the following components:       Result Value    Beta-Hcg Urine Positive (*)     All other components within normal limits   VAGINAL PATHOGENS DNA PANEL - Abnormal; Notable for the following components:    Gardnerella vaginalis DNA Probe POSITIVE (*)     All other components within normal limits   HCG QUANTITATIVE - Abnormal; Notable for the following components:    Bhcg 4029.0 (*)     All other components within normal limits   CHLAMYDIA/GC, PCR (URINE)   T.PALLIDUM AB EIA   HIV AG/AB COMBO ASSAY SCREENING   URINALYSIS   CHLAMYDIA/GC, PCR (GENITAL/ANAL SWAB)

## 2022-07-14 ENCOUNTER — PATIENT OUTREACH (OUTPATIENT)
Dept: SCHEDULING | Facility: IMAGING CENTER | Age: 32
End: 2022-07-14

## 2022-07-25 ENCOUNTER — HOSPITAL ENCOUNTER (EMERGENCY)
Facility: MEDICAL CENTER | Age: 32
End: 2022-07-25
Payer: COMMERCIAL

## 2022-07-25 VITALS
DIASTOLIC BLOOD PRESSURE: 73 MMHG | TEMPERATURE: 97 F | WEIGHT: 137.35 LBS | SYSTOLIC BLOOD PRESSURE: 133 MMHG | HEART RATE: 67 BPM | OXYGEN SATURATION: 99 % | BODY MASS INDEX: 20.82 KG/M2 | HEIGHT: 68 IN | RESPIRATION RATE: 16 BRPM

## 2022-07-25 LAB
ALBUMIN SERPL BCP-MCNC: 4.6 G/DL (ref 3.2–4.9)
ALBUMIN/GLOB SERPL: 1.4 G/DL
ALP SERPL-CCNC: 53 U/L (ref 30–99)
ALT SERPL-CCNC: 13 U/L (ref 2–50)
ANION GAP SERPL CALC-SCNC: 11 MMOL/L (ref 7–16)
APPEARANCE UR: CLEAR
AST SERPL-CCNC: 20 U/L (ref 12–45)
B-HCG SERPL-ACNC: ABNORMAL MIU/ML (ref 0–5)
BACTERIA #/AREA URNS HPF: ABNORMAL /HPF
BASOPHILS # BLD AUTO: 0.8 % (ref 0–1.8)
BASOPHILS # BLD: 0.03 K/UL (ref 0–0.12)
BILIRUB SERPL-MCNC: 0.5 MG/DL (ref 0.1–1.5)
BILIRUB UR QL STRIP.AUTO: NEGATIVE
BUN SERPL-MCNC: 9 MG/DL (ref 8–22)
CALCIUM SERPL-MCNC: 9.3 MG/DL (ref 8.5–10.5)
CHLORIDE SERPL-SCNC: 102 MMOL/L (ref 96–112)
CO2 SERPL-SCNC: 21 MMOL/L (ref 20–33)
COLOR UR: YELLOW
CREAT SERPL-MCNC: 0.82 MG/DL (ref 0.5–1.4)
EOSINOPHIL # BLD AUTO: 0.03 K/UL (ref 0–0.51)
EOSINOPHIL NFR BLD: 0.8 % (ref 0–6.9)
EPI CELLS #/AREA URNS HPF: ABNORMAL /HPF
ERYTHROCYTE [DISTWIDTH] IN BLOOD BY AUTOMATED COUNT: 47.6 FL (ref 35.9–50)
GFR SERPLBLD CREATININE-BSD FMLA CKD-EPI: 99 ML/MIN/1.73 M 2
GLOBULIN SER CALC-MCNC: 3.2 G/DL (ref 1.9–3.5)
GLUCOSE SERPL-MCNC: 89 MG/DL (ref 65–99)
GLUCOSE UR STRIP.AUTO-MCNC: NEGATIVE MG/DL
HCT VFR BLD AUTO: 38.7 % (ref 37–47)
HGB BLD-MCNC: 12.8 G/DL (ref 12–16)
HYALINE CASTS #/AREA URNS LPF: ABNORMAL /LPF
IMM GRANULOCYTES # BLD AUTO: 0 K/UL (ref 0–0.11)
IMM GRANULOCYTES NFR BLD AUTO: 0 % (ref 0–0.9)
KETONES UR STRIP.AUTO-MCNC: NEGATIVE MG/DL
LEUKOCYTE ESTERASE UR QL STRIP.AUTO: ABNORMAL
LIPASE SERPL-CCNC: 21 U/L (ref 11–82)
LYMPHOCYTES # BLD AUTO: 1.69 K/UL (ref 1–4.8)
LYMPHOCYTES NFR BLD: 44.8 % (ref 22–41)
MCH RBC QN AUTO: 30.6 PG (ref 27–33)
MCHC RBC AUTO-ENTMCNC: 33.1 G/DL (ref 33.6–35)
MCV RBC AUTO: 92.6 FL (ref 81.4–97.8)
MICRO URNS: ABNORMAL
MONOCYTES # BLD AUTO: 0.43 K/UL (ref 0–0.85)
MONOCYTES NFR BLD AUTO: 11.4 % (ref 0–13.4)
NEUTROPHILS # BLD AUTO: 1.59 K/UL (ref 2–7.15)
NEUTROPHILS NFR BLD: 42.2 % (ref 44–72)
NITRITE UR QL STRIP.AUTO: NEGATIVE
NRBC # BLD AUTO: 0 K/UL
NRBC BLD-RTO: 0 /100 WBC
PH UR STRIP.AUTO: 8 [PH] (ref 5–8)
PLATELET # BLD AUTO: 276 K/UL (ref 164–446)
PMV BLD AUTO: 10.9 FL (ref 9–12.9)
POTASSIUM SERPL-SCNC: 4 MMOL/L (ref 3.6–5.5)
PROT SERPL-MCNC: 7.8 G/DL (ref 6–8.2)
PROT UR QL STRIP: NEGATIVE MG/DL
RBC # BLD AUTO: 4.18 M/UL (ref 4.2–5.4)
RBC # URNS HPF: ABNORMAL /HPF
RBC UR QL AUTO: NEGATIVE
SODIUM SERPL-SCNC: 134 MMOL/L (ref 135–145)
SP GR UR STRIP.AUTO: 1.02
UROBILINOGEN UR STRIP.AUTO-MCNC: 0.2 MG/DL
WBC # BLD AUTO: 3.8 K/UL (ref 4.8–10.8)
WBC #/AREA URNS HPF: ABNORMAL /HPF

## 2022-07-25 PROCEDURE — 85025 COMPLETE CBC W/AUTO DIFF WBC: CPT

## 2022-07-25 PROCEDURE — 83690 ASSAY OF LIPASE: CPT

## 2022-07-25 PROCEDURE — 84702 CHORIONIC GONADOTROPIN TEST: CPT

## 2022-07-25 PROCEDURE — 81001 URINALYSIS AUTO W/SCOPE: CPT

## 2022-07-25 PROCEDURE — 80053 COMPREHEN METABOLIC PANEL: CPT

## 2022-07-25 PROCEDURE — 302449 STATCHG TRIAGE ONLY (STATISTIC)

## 2022-07-25 ASSESSMENT — FIBROSIS 4 INDEX: FIB4 SCORE: 0.45

## 2022-07-25 NOTE — ED NOTES
Pt very frustrated w/wait times up to  stating she wishes to leave. Pt told to come back if anything changes or gets worse. Pt signed ama form and ambulated out of er.

## 2022-10-31 ENCOUNTER — APPOINTMENT (OUTPATIENT)
Dept: RADIOLOGY | Facility: MEDICAL CENTER | Age: 32
End: 2022-10-31
Attending: EMERGENCY MEDICINE
Payer: MEDICARE

## 2022-10-31 ENCOUNTER — HOSPITAL ENCOUNTER (EMERGENCY)
Facility: MEDICAL CENTER | Age: 32
End: 2022-10-31
Attending: EMERGENCY MEDICINE
Payer: MEDICARE

## 2022-10-31 VITALS
HEIGHT: 69 IN | TEMPERATURE: 97.6 F | BODY MASS INDEX: 19.66 KG/M2 | DIASTOLIC BLOOD PRESSURE: 70 MMHG | WEIGHT: 132.72 LBS | RESPIRATION RATE: 18 BRPM | SYSTOLIC BLOOD PRESSURE: 121 MMHG | HEART RATE: 55 BPM | OXYGEN SATURATION: 95 %

## 2022-10-31 DIAGNOSIS — R11.2 NAUSEA AND VOMITING, UNSPECIFIED VOMITING TYPE: ICD-10-CM

## 2022-10-31 LAB
ALBUMIN SERPL BCP-MCNC: 4.5 G/DL (ref 3.2–4.9)
ALBUMIN/GLOB SERPL: 1.4 G/DL
ALP SERPL-CCNC: 60 U/L (ref 30–99)
ALT SERPL-CCNC: 14 U/L (ref 2–50)
ANION GAP SERPL CALC-SCNC: 10 MMOL/L (ref 7–16)
APPEARANCE UR: CLEAR
AST SERPL-CCNC: 18 U/L (ref 12–45)
B-HCG SERPL-ACNC: 4343 MIU/ML (ref 0–5)
BASOPHILS # BLD AUTO: 1.1 % (ref 0–1.8)
BASOPHILS # BLD: 0.04 K/UL (ref 0–0.12)
BILIRUB SERPL-MCNC: 1.2 MG/DL (ref 0.1–1.5)
BILIRUB UR QL STRIP.AUTO: NEGATIVE
BUN SERPL-MCNC: 14 MG/DL (ref 8–22)
CALCIUM SERPL-MCNC: 9 MG/DL (ref 8.5–10.5)
CHLORIDE SERPL-SCNC: 107 MMOL/L (ref 96–112)
CO2 SERPL-SCNC: 19 MMOL/L (ref 20–33)
COLOR UR: YELLOW
CREAT SERPL-MCNC: 0.71 MG/DL (ref 0.5–1.4)
EOSINOPHIL # BLD AUTO: 0.05 K/UL (ref 0–0.51)
EOSINOPHIL NFR BLD: 1.4 % (ref 0–6.9)
ERYTHROCYTE [DISTWIDTH] IN BLOOD BY AUTOMATED COUNT: 44.6 FL (ref 35.9–50)
GFR SERPLBLD CREATININE-BSD FMLA CKD-EPI: 117 ML/MIN/1.73 M 2
GLOBULIN SER CALC-MCNC: 3.2 G/DL (ref 1.9–3.5)
GLUCOSE SERPL-MCNC: 109 MG/DL (ref 65–99)
GLUCOSE UR STRIP.AUTO-MCNC: NEGATIVE MG/DL
HCT VFR BLD AUTO: 42.1 % (ref 37–47)
HGB BLD-MCNC: 14 G/DL (ref 12–16)
IMM GRANULOCYTES # BLD AUTO: 0.01 K/UL (ref 0–0.11)
IMM GRANULOCYTES NFR BLD AUTO: 0.3 % (ref 0–0.9)
KETONES UR STRIP.AUTO-MCNC: 15 MG/DL
LEUKOCYTE ESTERASE UR QL STRIP.AUTO: NEGATIVE
LIPASE SERPL-CCNC: 22 U/L (ref 11–82)
LYMPHOCYTES # BLD AUTO: 1.33 K/UL (ref 1–4.8)
LYMPHOCYTES NFR BLD: 36.1 % (ref 22–41)
MCH RBC QN AUTO: 30.1 PG (ref 27–33)
MCHC RBC AUTO-ENTMCNC: 33.3 G/DL (ref 33.6–35)
MCV RBC AUTO: 90.5 FL (ref 81.4–97.8)
MICRO URNS: ABNORMAL
MONOCYTES # BLD AUTO: 0.55 K/UL (ref 0–0.85)
MONOCYTES NFR BLD AUTO: 14.9 % (ref 0–13.4)
NEUTROPHILS # BLD AUTO: 1.7 K/UL (ref 2–7.15)
NEUTROPHILS NFR BLD: 46.2 % (ref 44–72)
NITRITE UR QL STRIP.AUTO: NEGATIVE
NRBC # BLD AUTO: 0 K/UL
NRBC BLD-RTO: 0 /100 WBC
PH UR STRIP.AUTO: 5.5 [PH] (ref 5–8)
PLATELET # BLD AUTO: 285 K/UL (ref 164–446)
PMV BLD AUTO: 9.7 FL (ref 9–12.9)
POTASSIUM SERPL-SCNC: 3.4 MMOL/L (ref 3.6–5.5)
PROT SERPL-MCNC: 7.7 G/DL (ref 6–8.2)
PROT UR QL STRIP: NEGATIVE MG/DL
RBC # BLD AUTO: 4.65 M/UL (ref 4.2–5.4)
RBC UR QL AUTO: NEGATIVE
SODIUM SERPL-SCNC: 136 MMOL/L (ref 135–145)
SP GR UR STRIP.AUTO: 1.03
UROBILINOGEN UR STRIP.AUTO-MCNC: 1 MG/DL
WBC # BLD AUTO: 3.7 K/UL (ref 4.8–10.8)

## 2022-10-31 PROCEDURE — 84702 CHORIONIC GONADOTROPIN TEST: CPT

## 2022-10-31 PROCEDURE — 96374 THER/PROPH/DIAG INJ IV PUSH: CPT

## 2022-10-31 PROCEDURE — 99284 EMERGENCY DEPT VISIT MOD MDM: CPT

## 2022-10-31 PROCEDURE — 81003 URINALYSIS AUTO W/O SCOPE: CPT

## 2022-10-31 PROCEDURE — 36415 COLL VENOUS BLD VENIPUNCTURE: CPT

## 2022-10-31 PROCEDURE — 83690 ASSAY OF LIPASE: CPT

## 2022-10-31 PROCEDURE — 700111 HCHG RX REV CODE 636 W/ 250 OVERRIDE (IP): Performed by: EMERGENCY MEDICINE

## 2022-10-31 PROCEDURE — 80053 COMPREHEN METABOLIC PANEL: CPT

## 2022-10-31 PROCEDURE — 700105 HCHG RX REV CODE 258: Performed by: EMERGENCY MEDICINE

## 2022-10-31 PROCEDURE — 76817 TRANSVAGINAL US OBSTETRIC: CPT

## 2022-10-31 PROCEDURE — 85025 COMPLETE CBC W/AUTO DIFF WBC: CPT

## 2022-10-31 RX ORDER — ONDANSETRON 2 MG/ML
4 INJECTION INTRAMUSCULAR; INTRAVENOUS ONCE
Status: COMPLETED | OUTPATIENT
Start: 2022-10-31 | End: 2022-10-31

## 2022-10-31 RX ORDER — SODIUM CHLORIDE 9 MG/ML
1000 INJECTION, SOLUTION INTRAVENOUS ONCE
Status: COMPLETED | OUTPATIENT
Start: 2022-10-31 | End: 2022-10-31

## 2022-10-31 RX ORDER — ONDANSETRON 4 MG/1
4 TABLET, ORALLY DISINTEGRATING ORAL EVERY 8 HOURS PRN
Qty: 10 TABLET | Refills: 0 | Status: ON HOLD | OUTPATIENT
Start: 2022-10-31 | End: 2023-04-30

## 2022-10-31 RX ADMIN — SODIUM CHLORIDE 1000 ML: 9 INJECTION, SOLUTION INTRAVENOUS at 09:17

## 2022-10-31 RX ADMIN — ONDANSETRON 4 MG: 2 INJECTION INTRAMUSCULAR; INTRAVENOUS at 09:14

## 2022-10-31 ASSESSMENT — FIBROSIS 4 INDEX: FIB4 SCORE: 0.6

## 2022-10-31 NOTE — ED NOTES
"Pt called from [facility] and ambulated to [facility]. Patient c/o nausea and vomiting. Patient states \"I feel like I can't keep anything down and I just keep gagging like I am going to throw up.\"   Last episode of vomiting was Saturday.  Patient providing urine sample at this time.  "

## 2022-10-31 NOTE — ED PROVIDER NOTES
"ED Provider Note    CHIEF COMPLAINT  Chief Complaint   Patient presents with    Nausea/Vomiting/Diarrhea     Patient reports N/V/D that stated approx one week ago. Unable to keep food or drink down       HPI  Marly Tabares is a 32 y.o. female who presents with nausea, vomiting and diarrhea.  Patient started getting sick with nausea for about a week.  No vomiting up until yesterday when she vomited 1 time.  She has diarrhea about every other day.  Reports frequent episodes during the day of diarrhea.  No blood in the stool or emesis.  She has had associated congestion, runny nose sore throat.  Denies dysuria hematuria frequency.  She is currently pregnant LMP about 2 months ago.  She has not had a fever or chills vaginal bleeding and cramping.    Patient's boss recently had COVID.    REVIEW OF SYSTEMS  As per HPI, otherwise a 10 point review of systems is negative    PAST MEDICAL HISTORY  Past Medical History:   Diagnosis Date    Asthma        SOCIAL HISTORY  Social History     Tobacco Use    Smoking status: Every Day     Years: 2.00     Types: Cigarettes    Smokeless tobacco: Never   Substance Use Topics    Alcohol use: Yes     Comment: socially    Drug use: No       SURGICAL HISTORY  Past Surgical History:   Procedure Laterality Date    GYN SURGERY  2012    [facility]       CURRENT MEDICATIONS  Home Medications       Reviewed by Bethany Cash R.N. (Registered Nurse) on 10/31/22 at 0837  Med List Status: Partial     Medication Last Dose Status        Patient Naveen Taking any Medications                           ALLERGIES  No Known Allergies    PHYSICAL EXAM  VITAL SIGNS: BP (!) 146/91   Pulse (!) 107   Temp 37.1 °C (98.7 °F) (Temporal)   Resp (!) 22   Ht 1.753 m (5' 9\")   Wt 60.2 kg (132 lb 11.5 oz)   LMP 09/18/2022   SpO2 100%   BMI 19.60 kg/m²    Constitutional: Awake and alert  HENT: Normal inspection  Eyes: Normal inspection  Neck: Grossly normal range of motion.  Cardiovascular: Normal heart rate, Normal " rhythm.  Symmetric peripheral pulses.   Thorax & Lungs: No respiratory distress, No wheezing, No rales, No rhonchi, No chest tenderness.   Abdomen: Bowel sounds normal, soft, non-distended, nontender, no mass  Skin: No obvious rash.  Back: No tenderness, No CVA tenderness.   Extremities: No clubbing, cyanosis, edema, no Homans or cords.  Neurologic: Grossly normal   Psychiatric: Normal for situation    RADIOLOGY/PROCEDURES  US-OB TRANSVAGINAL ONLY   Final Result      1.  Single Intrauterine gestational sac at 5 weeks 4 days estimated gestational age. No fetal pole identified. This may reflect early pregnancy. Recommend continued beta hCG and sonographic follow up.      2.  Redemonstration of a hemorrhagic cyst left ovary measuring up to 2.6 cm.      INTERPRETING LOCATION: [address] [place] [address], [facility]           Imaging is interpreted by radiologist    Labs:  Results for orders placed or performed during the hospital encounter of 10/31/22   CBC WITH DIFFERENTIAL   Result Value Ref Range    WBC 3.7 (L) 4.8 - 10.8 K/uL    RBC 4.65 4.20 - 5.40 M/uL    Hemoglobin 14.0 12.0 - 16.0 g/dL    Hematocrit 42.1 37.0 - 47.0 %    MCV 90.5 81.4 - 97.8 fL    MCH 30.1 27.0 - 33.0 pg    MCHC 33.3 (L) 33.6 - 35.0 g/dL    RDW 44.6 35.9 - 50.0 fL    Platelet Count 285 164 - 446 K/uL    MPV 9.7 9.0 - 12.9 fL    Neutrophils-Polys 46.20 44.00 - 72.00 %    Lymphocytes 36.10 22.00 - 41.00 %    Monocytes 14.90 (H) 0.00 - 13.40 %    Eosinophils 1.40 0.00 - 6.90 %    Basophils 1.10 0.00 - 1.80 %    Immature Granulocytes 0.30 0.00 - 0.90 %    Nucleated RBC 0.00 /100 WBC    Neutrophils (Absolute) 1.70 (L) 2.00 - 7.15 K/uL    Lymphs (Absolute) 1.33 1.00 - 4.80 K/uL    Monos (Absolute) 0.55 0.00 - 0.85 K/uL    Eos (Absolute) 0.05 0.00 - 0.51 K/uL    Baso (Absolute) 0.04 0.00 - 0.12 K/uL    Immature Granulocytes (abs) 0.01 0.00 - 0.11 K/uL    NRBC (Absolute) 0.00 K/uL   COMP METABOLIC PANEL   Result Value Ref Range    Sodium 136 135 - 145 mmol/L     Potassium 3.4 (L) 3.6 - 5.5 mmol/L    Chloride 107 96 - 112 mmol/L    Co2 19 (L) 20 - 33 mmol/L    Anion Gap 10.0 7.0 - 16.0    Glucose 109 (H) 65 - 99 mg/dL    Bun 14 8 - 22 mg/dL    Creatinine 0.71 0.50 - 1.40 mg/dL    Calcium 9.0 8.5 - 10.5 mg/dL    AST(SGOT) 18 12 - 45 U/L    ALT(SGPT) 14 2 - 50 U/L    Alkaline Phosphatase 60 30 - 99 U/L    Total Bilirubin 1.2 0.1 - 1.5 mg/dL    Albumin 4.5 3.2 - 4.9 g/dL    Total Protein 7.7 6.0 - 8.2 g/dL    Globulin 3.2 1.9 - 3.5 g/dL    A-G Ratio 1.4 g/dL   LIPASE   Result Value Ref Range    Lipase 22 11 - 82 U/L   HCG QUANTITATIVE   Result Value Ref Range    Bhcg 4343.0 (H) 0.0 - 5.0 mIU/mL   URINALYSIS,CULTURE IF INDICATED    Specimen: Urine   Result Value Ref Range    Color Yellow     Character Clear     Specific Gravity 1.029 <1.035    Ph 5.5 5.0 - 8.0    Glucose Negative Negative mg/dL    Ketones 15 (A) Negative mg/dL    Protein Negative Negative mg/dL    Bilirubin Negative Negative    Urobilinogen, Urine 1.0 Negative    Nitrite Negative Negative    Leukocyte Esterase Negative Negative    Occult Blood Negative Negative    Micro Urine Req see below    ESTIMATED GFR   Result Value Ref Range    GFR (CKD-EPI) 117 >60 mL/min/1.73 m 2       Medications   NS infusion 1,000 mL (1,000 mL Intravenous New Bag 10/31/22 0917)   ondansetron (ZOFRAN) syringe/vial injection 4 mg (4 mg Intravenous Given 10/31/22 0914)       Measures:  HYDRATION: Based on the patient's presentation of Acute Vomiting the patient was given IV fluids. IV Hydration was used because oral hydration was not adequate alone. Upon recheck following hydration, the patient was improved.    COURSE & MEDICAL DECISION MAKING  Patient presents with nausea vomiting diarrhea has URI symptoms.  No exposure to COVID and this is high on differential however patient declines COVID testing.  She is currently pregnant.  Obtain diagnostic work-up.  She has IUP identified.  She does not have a urinary tract infection.  Labs were  unremarkable otherwise.  Patient was treated with Zofran and IV fluids and felt better.  No vomiting here.    At this point patient is stable for discharge.  Of advised plenty of fluids.  Given a prescription for Zofran to take if she has repeated vomiting.  Rest Tylenol as needed.  Return to ER for abdominal pain, fevers or concern.    FINAL IMPRESSION  1.  Vomiting and diarrhea, suspected viral illness      This dictation was created using voice recognition software. The accuracy of the dictation is limited to the abilities of the software.  The nursing notes were reviewed and certain aspects of this information were incorporated into this note.      Electronically signed by: Jericho Mota M.D., 10/31/2022 10:04 AM

## 2022-10-31 NOTE — ED TRIAGE NOTES
"Chief Complaint   Patient presents with    Nausea/Vomiting/Diarrhea     Patient reports N/V/D that stated approx one week ago. Unable to keep food or drink down       33 yo female to triage for above complaint. Patient believes she believes she is pregnant. LMP 9/18. Has not had appt with OB    Pt is alert and oriented, speaking in full sentences, follows commands and responds appropriately to questions.     Patient placed back in lobby and educated on triage process. Asked to inform RN of any changes.    BP (!) 146/91   Pulse (!) 107   Temp 37.1 °C (98.7 °F) (Temporal)   Resp (!) 22   Ht 1.753 m (5' 9\")   Wt 60.2 kg (132 lb 11.5 oz)   LMP 09/18/2022   SpO2 100%   BMI 19.60 kg/m²     "

## 2022-11-14 ENCOUNTER — APPOINTMENT (OUTPATIENT)
Dept: RADIOLOGY | Facility: MEDICAL CENTER | Age: 32
End: 2022-11-14
Attending: EMERGENCY MEDICINE
Payer: MEDICARE

## 2022-11-14 ENCOUNTER — HOSPITAL ENCOUNTER (EMERGENCY)
Facility: MEDICAL CENTER | Age: 32
End: 2022-11-14
Attending: EMERGENCY MEDICINE
Payer: MEDICARE

## 2022-11-14 VITALS
SYSTOLIC BLOOD PRESSURE: 139 MMHG | OXYGEN SATURATION: 98 % | TEMPERATURE: 98.6 F | RESPIRATION RATE: 14 BRPM | BODY MASS INDEX: 20.71 KG/M2 | HEART RATE: 72 BPM | WEIGHT: 140.21 LBS | DIASTOLIC BLOOD PRESSURE: 94 MMHG

## 2022-11-14 DIAGNOSIS — J06.9 UPPER RESPIRATORY TRACT INFECTION, UNSPECIFIED TYPE: ICD-10-CM

## 2022-11-14 PROCEDURE — 99283 EMERGENCY DEPT VISIT LOW MDM: CPT | Mod: 25

## 2022-11-14 PROCEDURE — 71045 X-RAY EXAM CHEST 1 VIEW: CPT

## 2022-11-14 ASSESSMENT — FIBROSIS 4 INDEX: FIB4 SCORE: 0.51

## 2022-11-14 ASSESSMENT — PAIN DESCRIPTION - PAIN TYPE: TYPE: ACUTE PAIN

## 2022-11-14 NOTE — ED PROVIDER NOTES
ED Provider Note    CHIEF COMPLAINT  Chief Complaint   Patient presents with    Cough       HPI  Marly Tabares is a 32 y.o. female who presents with a past medical history seen for asthma, she smokes cigarettes, she presents with 1.5 weeks of cough.  She has had runny nose and sore throat last week that resolved.  Her son has similar symptoms at home.  She denies any vomiting or diarrhea.  She describes her symptoms as moderate to severe.    REVIEW OF SYSTEMS  See HPI for further details. All other systems are negative.     PAST MEDICAL HISTORY   has a past medical history of Asthma.    SOCIAL HISTORY  Social History     Tobacco Use    Smoking status: Every Day     Years: 2.00     Types: Cigarettes    Smokeless tobacco: Never   Substance and Sexual Activity    Alcohol use: Yes     Comment: socially    Drug use: No    Sexual activity: Not on file       SURGICAL HISTORY   has a past surgical history that includes gyn surgery (2012).    CURRENT MEDICATIONS  Home Medications       Reviewed by Samantha Moraes R.N. (Registered Nurse) on 11/14/22 at 0845  Med List Status: <None>     Medication Last Dose Status   ondansetron (ZOFRAN ODT) 4 MG TABLET DISPERSIBLE  Active                    ALLERGIES  No Known Allergies    FAMILY HISTORY  No pertinent family history    PHYSICAL EXAM  VITAL SIGNS: BP (!) 161/99   Pulse 61   Temp 36.3 °C (97.3 °F) (Temporal)   Resp 16   Wt 63.6 kg (140 lb 3.4 oz)   LMP 09/18/2022   SpO2 98%   Breastfeeding Unknown   BMI 20.71 kg/m²  @CARTER[[id number]::@   Pulse ox interpretation: I interpret this pulse ox as normal.  Constitutional: Alert in no apparent distress.  HENT: No signs of trauma, Bilateral external ears normal, Nose normal.   Eyes: Pupils are equal and reactive, Conjunctiva normal, Non-icteric.   Neck: Normal range of motion, No tenderness, Supple, No stridor.   Lymphatic: No lymphadenopathy noted.   Cardiovascular: Regular rate and rhythm, no murmurs.   Thorax & Lungs: Normal  breath sounds, No respiratory distress, No wheezing, No chest tenderness.   Abdomen: Bowel sounds normal, Soft, No tenderness, No masses, No pulsatile masses. No peritoneal signs.  Skin: Warm, Dry, No erythema, No rash.   Back: No bony tenderness, No CVA tenderness.   Extremities: Intact distal pulses, No edema, No tenderness, No cyanosis.  Musculoskeletal: Good range of motion in all major joints. No tenderness to palpation or major deformities noted.   Neurologic: Alert , Normal motor function, Normal sensory function, No focal deficits noted.   Psychiatric: Affect normal, Judgment normal, Mood normal.       DIAGNOSTIC STUDIES / PROCEDURES          RADIOLOGY  DX-CHEST-PORTABLE (1 VIEW)   Final Result      No acute cardiac or pulmonary abnormalities are identified.              COURSE & MEDICAL DECISION MAKING  Pertinent Labs & Imaging studies reviewed. (See chart for details)    The patient presents with upper respiratory symptoms.  Differential diagnose includes pneumonia, viral infection, flu, COVID.      Portable chest x-ray was ordered, COVID/flu test was ordered.    Patient would like to leave without a COVID or flu test because it will not affect her treatment.  I agree with this plan.  Her chest x-ray is negative for pneumonia.  I have counseled the patient on smoking cessation.    The patient will return for new or worsening symptoms and is stable at the time of discharge.    The patient is referred to a primary physician for blood pressure management, diabetic screening, and for all other preventative health concerns.        DISPOSITION:  Patient will be discharged home in stable condition.    FOLLOW UP:  [facility], Emergency Dept  [address] [facility] [id number] [phone]  Follow up  If symptoms worsen    [facility]  [address] [facility] [address]  [phone]  Follow up  Call for follow-up to establish a primary care doctor if you do not already have  one      OUTPATIENT MEDICATIONS:  New Prescriptions    No medications on file      The patient will return for worsening symptoms and is stable at the time of discharge. The patient verbalizes understanding and will comply.    FINAL IMPRESSION  1. Upper respiratory tract infection, unspecified type                   Electronically signed by: Delbert Valencia M.D., 11/14/2022 9:28 AM

## 2022-11-14 NOTE — ED TRIAGE NOTES
Patient has had a cough for 2 weeks, positive nasal drainage, brown sputum, no noted fevers, she does smoke.  Took otc medications with little relief.

## 2022-11-14 NOTE — ED NOTES
Pt ambulatory to room.  Pt is alert and oriented with a GCS of 15.  Pt complains of cough and runny nose for 1.5 weeks.

## 2022-12-12 ENCOUNTER — APPOINTMENT (OUTPATIENT)
Dept: RADIOLOGY | Facility: MEDICAL CENTER | Age: 32
End: 2022-12-12
Attending: EMERGENCY MEDICINE
Payer: MEDICARE

## 2022-12-12 ENCOUNTER — HOSPITAL ENCOUNTER (EMERGENCY)
Facility: MEDICAL CENTER | Age: 32
End: 2022-12-12
Attending: EMERGENCY MEDICINE
Payer: MEDICARE

## 2022-12-12 VITALS
SYSTOLIC BLOOD PRESSURE: 143 MMHG | RESPIRATION RATE: 16 BRPM | WEIGHT: 139.55 LBS | HEART RATE: 76 BPM | BODY MASS INDEX: 20.67 KG/M2 | TEMPERATURE: 98.1 F | HEIGHT: 69 IN | DIASTOLIC BLOOD PRESSURE: 78 MMHG | OXYGEN SATURATION: 99 %

## 2022-12-12 DIAGNOSIS — R10.30 LOWER ABDOMINAL PAIN: ICD-10-CM

## 2022-12-12 LAB
ALBUMIN SERPL BCP-MCNC: 4.7 G/DL (ref 3.2–4.9)
ALBUMIN/GLOB SERPL: 1.4 G/DL
ALP SERPL-CCNC: 67 U/L (ref 30–99)
ALT SERPL-CCNC: 20 U/L (ref 2–50)
ANION GAP SERPL CALC-SCNC: 12 MMOL/L (ref 7–16)
APPEARANCE UR: CLEAR
AST SERPL-CCNC: 36 U/L (ref 12–45)
B-HCG SERPL-ACNC: 13.9 MIU/ML (ref 0–5)
BASOPHILS # BLD AUTO: 1 % (ref 0–1.8)
BASOPHILS # BLD: 0.04 K/UL (ref 0–0.12)
BILIRUB SERPL-MCNC: 1 MG/DL (ref 0.1–1.5)
BILIRUB UR QL STRIP.AUTO: NEGATIVE
BUN SERPL-MCNC: 15 MG/DL (ref 8–22)
CALCIUM SERPL-MCNC: 9.5 MG/DL (ref 8.5–10.5)
CHLORIDE SERPL-SCNC: 102 MMOL/L (ref 96–112)
CO2 SERPL-SCNC: 22 MMOL/L (ref 20–33)
COLOR UR: YELLOW
CREAT SERPL-MCNC: 0.86 MG/DL (ref 0.5–1.4)
EOSINOPHIL # BLD AUTO: 0.08 K/UL (ref 0–0.51)
EOSINOPHIL NFR BLD: 1.9 % (ref 0–6.9)
ERYTHROCYTE [DISTWIDTH] IN BLOOD BY AUTOMATED COUNT: 49.2 FL (ref 35.9–50)
GFR SERPLBLD CREATININE-BSD FMLA CKD-EPI: 93 ML/MIN/1.73 M 2
GLOBULIN SER CALC-MCNC: 3.3 G/DL (ref 1.9–3.5)
GLUCOSE SERPL-MCNC: 101 MG/DL (ref 65–99)
GLUCOSE UR STRIP.AUTO-MCNC: NEGATIVE MG/DL
HCG SERPL QL: POSITIVE
HCT VFR BLD AUTO: 41.2 % (ref 37–47)
HGB BLD-MCNC: 13.5 G/DL (ref 12–16)
IMM GRANULOCYTES # BLD AUTO: 0.01 K/UL (ref 0–0.11)
IMM GRANULOCYTES NFR BLD AUTO: 0.2 % (ref 0–0.9)
KETONES UR STRIP.AUTO-MCNC: NEGATIVE MG/DL
LEUKOCYTE ESTERASE UR QL STRIP.AUTO: NEGATIVE
LIPASE SERPL-CCNC: 26 U/L (ref 11–82)
LYMPHOCYTES # BLD AUTO: 1.76 K/UL (ref 1–4.8)
LYMPHOCYTES NFR BLD: 42.6 % (ref 22–41)
MCH RBC QN AUTO: 30.5 PG (ref 27–33)
MCHC RBC AUTO-ENTMCNC: 32.8 G/DL (ref 33.6–35)
MCV RBC AUTO: 93.2 FL (ref 81.4–97.8)
MICRO URNS: NORMAL
MONOCYTES # BLD AUTO: 0.52 K/UL (ref 0–0.85)
MONOCYTES NFR BLD AUTO: 12.6 % (ref 0–13.4)
NEUTROPHILS # BLD AUTO: 1.72 K/UL (ref 2–7.15)
NEUTROPHILS NFR BLD: 41.7 % (ref 44–72)
NITRITE UR QL STRIP.AUTO: NEGATIVE
NRBC # BLD AUTO: 0 K/UL
NRBC BLD-RTO: 0 /100 WBC
NUMBER OF RH DOSES IND 8505RD: NORMAL
PH UR STRIP.AUTO: 6 [PH] (ref 5–8)
PLATELET # BLD AUTO: 313 K/UL (ref 164–446)
PMV BLD AUTO: 10 FL (ref 9–12.9)
POTASSIUM SERPL-SCNC: 4.1 MMOL/L (ref 3.6–5.5)
PROT SERPL-MCNC: 8 G/DL (ref 6–8.2)
PROT UR QL STRIP: NEGATIVE MG/DL
RBC # BLD AUTO: 4.42 M/UL (ref 4.2–5.4)
RBC UR QL AUTO: NEGATIVE
RH BLD: NORMAL
SODIUM SERPL-SCNC: 136 MMOL/L (ref 135–145)
SP GR UR STRIP.AUTO: >=1.03
UROBILINOGEN UR STRIP.AUTO-MCNC: 0.2 MG/DL
WBC # BLD AUTO: 4.1 K/UL (ref 4.8–10.8)

## 2022-12-12 PROCEDURE — 76801 OB US < 14 WKS SINGLE FETUS: CPT

## 2022-12-12 PROCEDURE — 81003 URINALYSIS AUTO W/O SCOPE: CPT

## 2022-12-12 PROCEDURE — 86901 BLOOD TYPING SEROLOGIC RH(D): CPT

## 2022-12-12 PROCEDURE — 84702 CHORIONIC GONADOTROPIN TEST: CPT

## 2022-12-12 PROCEDURE — 84703 CHORIONIC GONADOTROPIN ASSAY: CPT

## 2022-12-12 PROCEDURE — 99284 EMERGENCY DEPT VISIT MOD MDM: CPT

## 2022-12-12 PROCEDURE — 36415 COLL VENOUS BLD VENIPUNCTURE: CPT

## 2022-12-12 PROCEDURE — 83690 ASSAY OF LIPASE: CPT

## 2022-12-12 PROCEDURE — 80053 COMPREHEN METABOLIC PANEL: CPT

## 2022-12-12 PROCEDURE — 85025 COMPLETE CBC W/AUTO DIFF WBC: CPT

## 2022-12-12 ASSESSMENT — FIBROSIS 4 INDEX: FIB4 SCORE: 0.51

## 2022-12-12 NOTE — ED TRIAGE NOTES
Chief Complaint   Patient presents with    Abdominal Pain     Pt states had D&C approx a month ago, has been having worsening bilateral lower abd pain since. States they have not been improving and becoming more persistent. Denies vaginal bleeding. Denies n/v/d.      Pt ambulates to triage for above.     Took left over pain medicine from procedure with minimal relief.     Abd pain protocol initiated.     Pt educated on triage process, notified to informed staff of any changes, thanked for patience.

## 2022-12-12 NOTE — ED PROVIDER NOTES
ED Physician Note    Chief Concern:   Abdominal cramping, status post D&C    HPI:  Marly Tabares is a very pleasant 32-year-old woman who presents emergency department for evaluation of abdominal and pelvic cramping.  She underwent a D&C for elective pregnancy termination 1 month ago at [facility].  Estimated gestational age at that time was 5 weeks.  Since that time she has had persistent cramping, usually worse at night.  She is had no associated nausea or vomiting, she states she had 1 episode of vaginal bleeding after the procedure, but otherwise no bleeding and no discharge.  She reports no associated fevers, no chest pain, no shortness of breath.  She was able to call to get a follow-up appointment scheduled tomorrow, however as her symptoms been going on for a month she decided she should probably have it evaluated in the emergency department today.  She reports no urinary symptoms, no dysuria, no hematuria.  She has no prior history of similar symptoms.  Pain is described as a cramping sensation, at times severe, though typically alleviated without intervention.  Her primary concern is that her symptoms are recurrent, most recent episode of cramping was 2 nights ago.    Review of Systems:  See HPI for pertinent positives and negatives. All other systems negative.    Past Medical History:   has a past medical history of Asthma.    Social History:  Social History     Tobacco Use    Smoking status: Every Day     Years: 2.00     Types: Cigarettes    Smokeless tobacco: Never   Substance and Sexual Activity    Alcohol use: Yes     Comment: socially    Drug use: No    Sexual activity: Not on file       Surgical History:   has a past surgical history that includes gyn surgery (2012).    Current Medications:  Home Medications       Reviewed by Lee Ann Maldonado R.N. (Registered Nurse) on 12/12/22 at 1252  Med List Status: Partial     Medication Last Dose Status   ondansetron (ZOFRAN ODT) 4 MG TABLET DISPERSIBLE  Active  "                   Allergies:  No Known Allergies    Physical Exam:  Vital Signs: BP (!) 143/78   Pulse 76   Temp 36.7 °C (98.1 °F) (Temporal)   Resp 16   Ht 1.753 m (5' 9\")   Wt 63.3 kg (139 lb 8.8 oz)   LMP 11/18/2022   SpO2 99%   BMI 20.61 kg/m²   Constitutional: Alert, no acute distress  HENT: Normocephalic, mask in place  Eyes: Pupils equal and reactive, normal conjunctiva  Neck: Supple, normal range of motion, no stridor  Cardiovascular: Extremities are warm and well perfused, no murmur appreciated, normal cardiac auscultation  Pulmonary: No respiratory distress, normal work of breathing, no accessory muscule usage, breath sounds clear and equal bilaterally  Abdomen: Soft, non-distended, non-tender to palpation, no peritoneal signs, no lower abdominal tenderness to palpation, no rebound, no guarding  Skin: Warm, dry, no rashes or lesions  Musculoskeletal: Normal range of motion in all extremities, no swelling or deformity noted  Neurologic: Alert, oriented, normal speech, normal motor function  Psychiatric: Normal and appropriate mood and affect    Medical records reviewed for continuity of care.  This recent visit to this facility was 11/14/2022.  She has a history of asthma, was seen for evaluation of cough.  No abdominal pain mentioned at that visit.  I was able to use epic Care Everywhere to review her GYN visit 11/10/2022 at [facility].  She underwent D&C for elective pregnancy termination at 5 weeks estimated gestational age.  No complications identified.    Labs:  Labs Reviewed   CBC WITH DIFFERENTIAL - Abnormal; Notable for the following components:       Result Value    WBC 4.1 (*)     MCHC 32.8 (*)     Neutrophils-Polys 41.70 (*)     Lymphocytes 42.60 (*)     Neutrophils (Absolute) 1.72 (*)     All other components within normal limits   COMP METABOLIC PANEL - Abnormal; Notable for the following components:    Glucose 101 (*)     All other components within normal limits   HCG QUAL SERUM - " Abnormal; Notable for the following components:    Beta-Hcg Qualitative Serum Positive (*)     All other components within normal limits   HCG QUANTITATIVE - Abnormal; Notable for the following components:    Bhcg 13.9 (*)     All other components within normal limits    Narrative:     Print Consent?->No   LIPASE   URINALYSIS,CULTURE IF INDICATED    Narrative:     Indication for culture:->Patient WITHOUT an indwelling Oneil  catheter in place with new onset of Dysuria, Frequency,  Urgency, and/or Suprapubic pain   ESTIMATED GFR   RH TYPE FOR RHOGAM FROM E.D.    Narrative:     Print Consent?->No       Radiology:  US-OB 1ST TRIMESTER WITH TRANSVAGINAL (COMBO)   Final Result      No sonographic evidence for retained products of conception      Mildly thickened endometrium with mild cystic change. This could be tamoxifen induced in the proper clinical context. Hyperplasia is possible. No endometrial polyp is seen      Left intraovarian 34 mm mass likely has an endometrioma or hemorrhagic cyst. Attention in follow-up in 1.5 cycles is advised, at that time the endometrium could be reevaluated as well              ED Medications Administered:  Medications - No data to display    Differential diagnosis:  Heterotopic pregnancy, ectopic pregnancy, failed D&C, postprocedural cramping, urinary tract infection    MDM:  Ms. Tabares into the emergency department for evaluation of intermittent cramping after an elective D&C.  Her most recent episode of cramping was 2 nights ago, on arrival to the emergency department she is comfortable, not having any active abdominal pain.  She did have 1 episode of bleeding in the past month, is not having any active vaginal bleeding.  She does have a follow-up appointment with her gynecologist tomorrow.  On arrival to the emergency department her vital signs are reassuring, she is afebrile with no tachycardia, no lower abdominal pain.  Doubt postprocedural infection.  She has no chest pain, no  shortness of breath, doubt cardiopulmonary etiology.    On initial laboratory evaluation urinalysis is negative for evidence of infection.  hCG was positive.  Her white blood count is below normal reference range at 4.1, she has a normal hemoglobin no evidence of acute or concerning blood loss.  CMP is within normal limits, lipase is within normal limits.    She is Rh+.  Quantitative hCG was quite low at 13.9, no evidence of ongoing viable pregnancy with regard to quantitative hCG level.  Ultrasound demonstrates no sonographic evidence for retained products of conception.  There is a mildly thickened endometrium, left intraovarian mass present as well.  Close follow-up is recommended.    Plan at this time is for discharge home, as she does have a gynecologist appointment tomorrow.  She is counseled to review the results of her ultrasound today, as well as the abnormality regarding the left ovary, for follow-up as needed. Return precautions were discussed with the patient, and provided in written form with the patient's discharge instructions.     Disposition:  Discharge home in stable condition    Final Impression:  1. Lower abdominal pain        Electronically signed by Sondra Cesar MD

## 2022-12-13 NOTE — DISCHARGE INSTRUCTIONS
Please keep your scheduled gynecology appointment tomorrow. Call your primary care physician at the opening of business hours to let them know you were seen in the emergency department. Return immediately if your pain returns or worsens, if you develop any new symptoms, if you are not able to drink fluids, if you have persistent vomiting, if you develop fevers, or if you have any further concerns. Additionally, please return if your symptoms have not resolved and you are unable to follow up with your primary care physician for recheck.

## 2022-12-13 NOTE — ED NOTES
Patient discharged per order. Oral and written discharge instructions reviewed. All belongings accounted for and taken with patient. Questions answered, and patient agrees with discharge plan. Patient escorted to lobby. Encouraged to follow up with PCP. Will follow up with gynecology tomorrow

## 2023-04-30 ENCOUNTER — APPOINTMENT (OUTPATIENT)
Dept: RADIOLOGY | Facility: MEDICAL CENTER | Age: 33
End: 2023-04-30
Attending: ADVANCED PRACTICE MIDWIFE
Payer: COMMERCIAL

## 2023-04-30 ENCOUNTER — HOSPITAL ENCOUNTER (EMERGENCY)
Facility: MEDICAL CENTER | Age: 33
End: 2023-04-30
Attending: OBSTETRICS & GYNECOLOGY | Admitting: OBSTETRICS & GYNECOLOGY
Payer: COMMERCIAL

## 2023-04-30 ENCOUNTER — HOSPITAL ENCOUNTER (EMERGENCY)
Facility: MEDICAL CENTER | Age: 33
End: 2023-04-30
Payer: MEDICARE

## 2023-04-30 VITALS
HEART RATE: 66 BPM | SYSTOLIC BLOOD PRESSURE: 122 MMHG | WEIGHT: 147 LBS | RESPIRATION RATE: 16 BRPM | HEIGHT: 68 IN | BODY MASS INDEX: 22.28 KG/M2 | TEMPERATURE: 98.1 F | DIASTOLIC BLOOD PRESSURE: 72 MMHG | OXYGEN SATURATION: 98 %

## 2023-04-30 DIAGNOSIS — F41.8 OTHER SPECIFIED ANXIETY DISORDERS: ICD-10-CM

## 2023-04-30 DIAGNOSIS — F41.8 SITUATIONAL ANXIETY: ICD-10-CM

## 2023-04-30 PROCEDURE — 99284 EMERGENCY DEPT VISIT MOD MDM: CPT

## 2023-04-30 PROCEDURE — 76815 OB US LIMITED FETUS(S): CPT

## 2023-04-30 RX ORDER — HYDROXYZINE PAMOATE 25 MG/1
CAPSULE ORAL
Qty: 30 CAPSULE | Refills: 0 | Status: SHIPPED | OUTPATIENT
Start: 2023-04-30

## 2023-04-30 ASSESSMENT — ENCOUNTER SYMPTOMS
CONSTITUTIONAL NEGATIVE: 1
VOMITING: 0
RESPIRATORY NEGATIVE: 1
NAUSEA: 0
HEARTBURN: 0
CARDIOVASCULAR NEGATIVE: 1
ABDOMINAL PAIN: 0

## 2023-04-30 ASSESSMENT — FIBROSIS 4 INDEX: FIB4 SCORE: 0.94

## 2023-05-01 NOTE — ED PROVIDER NOTES
"Emergency Obstetric Consultation     Date of Service  2023    Reason for Consultation  No chief complaint on file.      History of Presenting Illness  32 y.o. female who presented 2023 with Reason for consult: nauseaPatient presents to OB ED for complaints of gagging, left sided pelvic pain, insomnia, and gush of fluid while gagging AT 23.3 weeks. She reports that she normally works the weekend and 12 hour shifts. She came to [place] and unfortunately found her boyfriend/FOB cheating on her. She reports since that time she has felt \"sick\". She denies any sick contacts or upper respiratory complaints. She reports that she intermittently will gag at the recall of events. During one of these times, she felt gush of fluid. No gush or leaking since that time.   She reports that before this weekend, she was struggling with her appetite and insomnia but since finding out this information, ,this has worsened. She used marijuana in pre pregnancy but last used weeks ago. She believes this also might be contributing to her low appetite.     She has not felt any cramping or contractions in the past two hours prior to presentation. She called her home office who recommended she present for evaluation.   Fetal activity: Perceived fetal activity is normal.    .    Review of Systems  Review of Systems   Constitutional: Negative.    Respiratory: Negative.     Cardiovascular: Negative.    Gastrointestinal:  Negative for abdominal pain, heartburn, nausea and vomiting.   Psychiatric/Behavioral:          Sadness     Obstetric History    OB History    Para Term  AB Living   5 1 1   3 1   SAB IAB Ectopic Molar Multiple Live Births   2 1       1      # Outcome Date GA Lbr Jacques/2nd Weight Sex Delivery Anes PTL Lv   5 Current            4 IAB 11/10/22 5w5d             Birth Comments:  RCO   3 SAB 22 6w1d             Birth Comments:  RCO   2 SAB 12 6w0d          1 Term 11 38w5d 02:15 2.43 kg (5 " lb 5.7 oz) M Vag-Spont None N RAFAELA      Birth Comments: Precipitous labor       Gynecologic History  Patient's last menstrual period was 2022.    Medical History  Past Medical History:   Diagnosis Date    Asthma        Surgical History   has a past surgical history that includes gyn surgery ().    Family History  family history is not on file.    Social History   reports that she has been smoking. She has never used smokeless tobacco. She reports current alcohol use. She reports that she does not use drugs.    Medications  Medications Prior to Admission   Medication Sig Dispense Refill Last Dose    ondansetron (ZOFRAN ODT) 4 MG TABLET DISPERSIBLE Take 1 Tablet by mouth every 8 hours as needed for Nausea. 10 Tablet 0        Allergies  No Known Allergies    Physical Exam  Maternal Exam:  Abdomen: Patient reports the following abdominal tenderness: LLQ.   Introitus: not evaluated.    Cervix: not evaluated.  Mode: hand-held doppler probe.    Baseline rate: 140.   Skin:     General: Skin is warm and dry.   HENT:      Head: Normocephalic.   Musculoskeletal:         General: Normal range of motion.   Abdominal:      General: Bowel sounds are normal.      Palpations: Abdomen is soft.      Tenderness: There is abdominal tenderness in the left lower quadrant.      Comments: gravid   Constitutional:       Appearance: She is ill-appearing.   Pulmonary:      Effort: Pulmonary effort is normal.   Psychiatric:         Behavior: Behavior normal.      Comments: Depressed mood, tearful   Neurological:      Mental Status: She is alert.           Assessment & Plan  Assessment:  Not in labor.   1. 31 yo  with IUP at 23.2 weeks  2. Reassuring fetal status  3. Adjustment disorder with depressed mood  4. Insomnia    Plan:  1. Discussed with patient expected somatic changes with recent acute stressor. Likely will need PRN vistaril to help with insomnia and any nausea that might exist. Hopefully this will help with her appetite.  Insomnia likely worsened from stressor.   2. US today to assess fluid but will complete vaginal exam if indicated.   3. I did recommend STI testing but patient will defer to her home OB office.         BITA Lockhart

## 2023-05-01 NOTE — PROGRESS NOTES
MENDEZ reviewed US. Pt with no further c/o leaking fluid and no contractions. Feels safe being discharged home.  labor precautions reviewed with pt. Has appt scheduled with her dr on Thursday. Instructed to keep. Return to L&D if any further symptoms. RX sent to pharmacy and patient will . Discharged home ambulatory at this time

## 2023-05-01 NOTE — PROGRESS NOTES
Pt is a 31 yo  at 23.3 weeks gestation reports to L&D with c/o feeling a gush of fluid after a gagging episode. Placed in bed, toco on pt. FHT obtained. Marvin Frankel CNM at bedside. VS obtained, WNL.

## 2023-05-28 ENCOUNTER — HOSPITAL ENCOUNTER (EMERGENCY)
Facility: MEDICAL CENTER | Age: 33
End: 2023-05-28
Attending: OBSTETRICS & GYNECOLOGY | Admitting: OBSTETRICS & GYNECOLOGY
Payer: COMMERCIAL

## 2023-05-28 VITALS
SYSTOLIC BLOOD PRESSURE: 137 MMHG | HEART RATE: 85 BPM | WEIGHT: 147 LBS | DIASTOLIC BLOOD PRESSURE: 63 MMHG | HEIGHT: 68 IN | BODY MASS INDEX: 22.28 KG/M2 | OXYGEN SATURATION: 97 % | TEMPERATURE: 97.4 F | RESPIRATION RATE: 16 BRPM

## 2023-05-28 PROCEDURE — 302449 STATCHG TRIAGE ONLY (STATISTIC)

## 2023-05-28 ASSESSMENT — FIBROSIS 4 INDEX: FIB4 SCORE: 0.94
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
## 2023-07-07 ENCOUNTER — HOSPITAL ENCOUNTER (EMERGENCY)
Facility: MEDICAL CENTER | Age: 33
End: 2023-07-07
Attending: OBSTETRICS & GYNECOLOGY | Admitting: OBSTETRICS & GYNECOLOGY
Payer: COMMERCIAL

## 2023-07-07 VITALS
OXYGEN SATURATION: 98 % | BODY MASS INDEX: 22.81 KG/M2 | WEIGHT: 154 LBS | TEMPERATURE: 98.1 F | DIASTOLIC BLOOD PRESSURE: 73 MMHG | SYSTOLIC BLOOD PRESSURE: 121 MMHG | HEIGHT: 69 IN | HEART RATE: 75 BPM | RESPIRATION RATE: 16 BRPM

## 2023-07-07 LAB
APPEARANCE UR: CLEAR
COLOR UR AUTO: YELLOW
GLUCOSE UR QL STRIP.AUTO: NEGATIVE MG/DL
KETONES UR QL STRIP.AUTO: NEGATIVE MG/DL
LEUKOCYTE ESTERASE UR QL STRIP.AUTO: NEGATIVE
NITRITE UR QL STRIP.AUTO: NEGATIVE
PH UR STRIP.AUTO: 6.5 [PH] (ref 5–8)
PROT UR QL STRIP: ABNORMAL MG/DL
RBC UR QL AUTO: NEGATIVE
SP GR UR STRIP.AUTO: 1.02 (ref 1–1.03)

## 2023-07-07 PROCEDURE — 99282 EMERGENCY DEPT VISIT SF MDM: CPT | Performed by: OBSTETRICS & GYNECOLOGY

## 2023-07-07 PROCEDURE — 81002 URINALYSIS NONAUTO W/O SCOPE: CPT

## 2023-07-07 PROCEDURE — 99282 EMERGENCY DEPT VISIT SF MDM: CPT

## 2023-07-07 ASSESSMENT — FIBROSIS 4 INDEX: FIB4 SCORE: 0.94

## 2023-07-07 ASSESSMENT — PAIN SCALES - GENERAL: PAINLEVEL: 0 - NO PAIN

## 2023-07-07 NOTE — ED PROVIDER NOTES
LABOR AND DELIVERY TRIAGE NOTE    PATIENT ID:  NAME:  Marly Tabares  MRN:               [id number]  YOB: 1990     32 y.o. female  at 33w0d.    Subjective: Pt presented with concerns of vaginal cramping and lower abdominal/pelvic pain since last 2 days. She states her pain has resolved now. No contractions, vaginal bleeding or leakage of fluid.     Prenatal care with [facility] in [place], [place]  Pregnancy has been uncomplicated per patient. We are awaiting prenatal records.     negative for contractions  negative pain   negative for LOF  negative for vaginal bleeding  negative for fetal movement    PNL:  Blood Type A pos, Rubella immune, HIV neg, TrepAb neg, HBsAg NR,   1 HR GTT: Unavailable  GBS Unknown      ROS: Patient denies any fever chills, nausea, vomiting, headache, chest pain, shortness of breath, or dysuria or unusual swelling of hands or feet.     PMHx:   Past Medical History:   Diagnosis Date    Asthma         OB History    Para Term  AB Living   5 1 1   3 1   SAB IAB Ectopic Molar Multiple Live Births   2 1       1      # Outcome Date GA Lbr Jacques/2nd Weight Sex Delivery Anes PTL Lv   5 Current            4 IAB 11/10/22 5w5d             Birth Comments:  RCO   3 SAB 22 6w1d             Birth Comments:  RCO   2 SAB 12 6w0d          1 Term 11 38w5d 02:15 2.43 kg (5 lb 5.7 oz) M Vag-Spont None N RAFAELA      Birth Comments: Precipitous labor       GYN: denies STIs, no cervical procedures    PSHx:  Past Surgical History:   Procedure Laterality Date    GYN SURGERY      DNC       Social Hx:  Social History     Tobacco Use    Smoking status: Former     Years: 2.00     Types: Cigarettes    Smokeless tobacco: Never   Vaping Use    Vaping Use: Never used   Substance Use Topics    Alcohol use: Not Currently     Comment: socially    Drug use: No         Medications:  No current facility-administered medications on file prior to encounter.     Current Outpatient  "Medications on File Prior to Encounter   Medication Sig Dispense Refill    hydrOXYzine pamoate (VISTARIL) 25 MG Cap Take 1 tab PO BID PRN for panic; may take 1-2 tabs PO QHS PRN for insomnia 30 Capsule 0       Allergies:  NKDA      Objective:    Vitals:    23 1505   BP: 121/73   Pulse: 75   Resp: 16   Temp: 36.7 °C (98.1 °F)   TempSrc: Temporal   SpO2: 98%   Weight: 69.9 kg (154 lb)   Height: 1.753 m (5' 9\")     Temp (24hrs), Av.7 °C (98.1 °F), Min:36.7 °C (98.1 °F), Max:36.7 °C (98.1 °F)    General: No acute distress, resting comfortably in bed.  HEENT: normocephalic, nontraumatic, PERRLA, EOMI  Cardiovascular: Heart RRR with no murmurs, rubs or gallops. Distal Pulses 2+  Respiratory: symmetric chest expansion, lungs CTAB, with no wheezes, rales, rhonci  Abdomen: gravid, nontender  Musculoskeletal: BEY spontaneously  Neuro: non focal with no numbness, tingling or changes in sensation    Cervix: Deferred  [place]couch: Quiet  FHRM: Baseline 130s, moderate variability, + accels, no decels, Cat I    Labs:    Latest Reference Range & Units 23 16:05   POC Color  Yellow   POC Appearance  Clear   POC Specific Gravity 1.005 - 1.030  1.020   POC Urine PH 5.0 - 8.0  6.5   POC Glucose Negative mg/dL Negative   POC Ketones Negative mg/dL Negative   POC Protein Negative mg/dL Trace !   POC Nitrites Negative  Negative   POC Leukocyte Esterase Negative  Negative   POC Blood Negative  Negative   !: Data is abnormal    Assessment/Plan: 32 y.o. female  at 33w0d presents with vaginal and pelvic cramping that has resolved. Unlikely UTI. Could be due to ligament pain or dehydration. Fetal tracing reassuring.     We recommended patient to stay until we obtained her full prenatal records however patient desires to leave AMA.     PTL precautions discussed. F/u with her primary OBGYN within 1 week for follow up.      Encouraged to see MD/DO for increased painful uterine contractions @ 3-5, vaginal bleeding, loss of fluid, or " other serious symptoms.          Una Pang M.D.

## 2023-07-07 NOTE — PROGRESS NOTES
Patient presents to triage at 33w0d, EDC 8/25/23, c/o intermittent abdominal cramping and vaginal pain. Denies LOF/VB. Reports +FM. Pregnancy uncomplicated. VSS. Dr. Pang updated about patient status. UA ordered. See results. Category 1 FHT, irritability noted on toco. Plan of care discussed.    1640 Dr Pang at bedside. Pt states she would like to go home AMA. MD advised pt we requested her past medical records and we were waiting for those to come back. Pt states she would like to go home despite MD advise.     1655 AMA form signed by pt and walked off unit with belongings.

## 2025-03-07 NOTE — DISCHARGE INSTRUCTIONS
Pre-term Labor (<37 weeks):  Call your physician or return to the hospital if:  You have painless regular contractions more than 4 in one hour.  Your water breaks (remember time and color).  You have menstrual-like cramps, a low dull backache or pressure in your pelvis or back.  Your baby does not move enough to complete the daily kick count (10 movements in 2 hours).  Your baby moves much less often than on the days before or you have not felt your baby move all day.  Please review the MEDICATION LIST section of your AFTER VISIT SUMMARY document.  Take your medication as prescribed    
DISPLAY PLAN FREE TEXT
DISPLAY PLAN FREE TEXT

## 2025-05-12 ENCOUNTER — HOSPITAL ENCOUNTER (EMERGENCY)
Facility: MEDICAL CENTER | Age: 35
End: 2025-05-12
Attending: EMERGENCY MEDICINE
Payer: COMMERCIAL

## 2025-05-12 VITALS
SYSTOLIC BLOOD PRESSURE: 128 MMHG | RESPIRATION RATE: 17 BRPM | HEART RATE: 60 BPM | TEMPERATURE: 97.1 F | OXYGEN SATURATION: 96 % | DIASTOLIC BLOOD PRESSURE: 67 MMHG | WEIGHT: 123.46 LBS | BODY MASS INDEX: 18.23 KG/M2

## 2025-05-12 DIAGNOSIS — R10.2 PELVIC PAIN: ICD-10-CM

## 2025-05-12 DIAGNOSIS — N76.0 VAGINOSIS: ICD-10-CM

## 2025-05-12 LAB
ALBUMIN SERPL BCP-MCNC: 4.3 G/DL (ref 3.2–4.9)
ALBUMIN/GLOB SERPL: 1.3 G/DL
ALP SERPL-CCNC: 69 U/L (ref 30–99)
ALT SERPL-CCNC: 30 U/L (ref 2–50)
ANION GAP SERPL CALC-SCNC: 11 MMOL/L (ref 7–16)
APPEARANCE UR: CLEAR
AST SERPL-CCNC: 34 U/L (ref 12–45)
BACTERIA #/AREA URNS HPF: ABNORMAL /HPF
BASOPHILS # BLD AUTO: 1.3 % (ref 0–1.8)
BASOPHILS # BLD: 0.05 K/UL (ref 0–0.12)
BILIRUB SERPL-MCNC: 1 MG/DL (ref 0.1–1.5)
BILIRUB UR QL STRIP.AUTO: NEGATIVE
BUN SERPL-MCNC: 9 MG/DL (ref 8–22)
C TRACH DNA GENITAL QL NAA+PROBE: NEGATIVE
CALCIUM ALBUM COR SERPL-MCNC: 9.1 MG/DL (ref 8.5–10.5)
CALCIUM SERPL-MCNC: 9.3 MG/DL (ref 8.5–10.5)
CANDIDA DNA VAG QL PROBE+SIG AMP: NEGATIVE
CASTS URNS QL MICRO: ABNORMAL /LPF (ref 0–2)
CHLORIDE SERPL-SCNC: 107 MMOL/L (ref 96–112)
CO2 SERPL-SCNC: 23 MMOL/L (ref 20–33)
COLOR UR: YELLOW
CREAT SERPL-MCNC: 0.9 MG/DL (ref 0.5–1.4)
EOSINOPHIL # BLD AUTO: 0.07 K/UL (ref 0–0.51)
EOSINOPHIL NFR BLD: 1.8 % (ref 0–6.9)
EPITHELIAL CELLS 1715: ABNORMAL /HPF (ref 0–5)
ERYTHROCYTE [DISTWIDTH] IN BLOOD BY AUTOMATED COUNT: 49.5 FL (ref 35.9–50)
G VAGINALIS DNA VAG QL PROBE+SIG AMP: POSITIVE
GFR SERPLBLD CREATININE-BSD FMLA CKD-EPI: 87 ML/MIN/1.73 M 2
GLOBULIN SER CALC-MCNC: 3.3 G/DL (ref 1.9–3.5)
GLUCOSE SERPL-MCNC: 111 MG/DL (ref 65–99)
GLUCOSE UR STRIP.AUTO-MCNC: NEGATIVE MG/DL
HCG SERPL QL: NEGATIVE
HCT VFR BLD AUTO: 41.9 % (ref 37–47)
HGB BLD-MCNC: 14 G/DL (ref 12–16)
IMM GRANULOCYTES # BLD AUTO: 0.01 K/UL (ref 0–0.11)
IMM GRANULOCYTES NFR BLD AUTO: 0.3 % (ref 0–0.9)
KETONES UR STRIP.AUTO-MCNC: NEGATIVE MG/DL
LEUKOCYTE ESTERASE UR QL STRIP.AUTO: ABNORMAL
LIPASE SERPL-CCNC: 23 U/L (ref 11–82)
LYMPHOCYTES # BLD AUTO: 1.4 K/UL (ref 1–4.8)
LYMPHOCYTES NFR BLD: 36.9 % (ref 22–41)
MCH RBC QN AUTO: 30.3 PG (ref 27–33)
MCHC RBC AUTO-ENTMCNC: 33.4 G/DL (ref 32.2–35.5)
MCV RBC AUTO: 90.7 FL (ref 81.4–97.8)
MICRO URNS: ABNORMAL
MONOCYTES # BLD AUTO: 0.44 K/UL (ref 0–0.85)
MONOCYTES NFR BLD AUTO: 11.6 % (ref 0–13.4)
N GONORRHOEA DNA GENITAL QL NAA+PROBE: NEGATIVE
NEUTROPHILS # BLD AUTO: 1.82 K/UL (ref 1.82–7.42)
NEUTROPHILS NFR BLD: 48.1 % (ref 44–72)
NITRITE UR QL STRIP.AUTO: NEGATIVE
NRBC # BLD AUTO: 0 K/UL
NRBC BLD-RTO: 0 /100 WBC (ref 0–0.2)
PH UR STRIP.AUTO: 6 [PH] (ref 5–8)
PLATELET # BLD AUTO: 332 K/UL (ref 164–446)
PMV BLD AUTO: 9.8 FL (ref 9–12.9)
POTASSIUM SERPL-SCNC: 3.5 MMOL/L (ref 3.6–5.5)
PROT SERPL-MCNC: 7.6 G/DL (ref 6–8.2)
PROT UR QL STRIP: NEGATIVE MG/DL
RBC # BLD AUTO: 4.62 M/UL (ref 4.2–5.4)
RBC # URNS HPF: ABNORMAL /HPF (ref 0–2)
RBC UR QL AUTO: NEGATIVE
SODIUM SERPL-SCNC: 141 MMOL/L (ref 135–145)
SP GR UR STRIP.AUTO: 1.02
SPECIMEN SOURCE: NORMAL
T PALLIDUM AB SER QL IA: NORMAL
T VAGINALIS DNA VAG QL PROBE+SIG AMP: NEGATIVE
UROBILINOGEN UR STRIP.AUTO-MCNC: 1 EU/DL
WBC # BLD AUTO: 3.8 K/UL (ref 4.8–10.8)
WBC #/AREA URNS HPF: ABNORMAL /HPF

## 2025-05-12 PROCEDURE — 99284 EMERGENCY DEPT VISIT MOD MDM: CPT

## 2025-05-12 PROCEDURE — 87491 CHLMYD TRACH DNA AMP PROBE: CPT

## 2025-05-12 PROCEDURE — 36415 COLL VENOUS BLD VENIPUNCTURE: CPT

## 2025-05-12 PROCEDURE — 87660 TRICHOMONAS VAGIN DIR PROBE: CPT

## 2025-05-12 PROCEDURE — 83690 ASSAY OF LIPASE: CPT

## 2025-05-12 PROCEDURE — 87510 GARDNER VAG DNA DIR PROBE: CPT

## 2025-05-12 PROCEDURE — 86780 TREPONEMA PALLIDUM: CPT

## 2025-05-12 PROCEDURE — 87480 CANDIDA DNA DIR PROBE: CPT

## 2025-05-12 PROCEDURE — 87591 N.GONORRHOEAE DNA AMP PROB: CPT

## 2025-05-12 PROCEDURE — RXMED WILLOW AMBULATORY MEDICATION CHARGE: Performed by: EMERGENCY MEDICINE

## 2025-05-12 PROCEDURE — 96372 THER/PROPH/DIAG INJ SC/IM: CPT

## 2025-05-12 PROCEDURE — 81001 URINALYSIS AUTO W/SCOPE: CPT

## 2025-05-12 PROCEDURE — 700111 HCHG RX REV CODE 636 W/ 250 OVERRIDE (IP): Mod: JZ | Performed by: EMERGENCY MEDICINE

## 2025-05-12 PROCEDURE — 84703 CHORIONIC GONADOTROPIN ASSAY: CPT

## 2025-05-12 PROCEDURE — 700102 HCHG RX REV CODE 250 W/ 637 OVERRIDE(OP): Performed by: EMERGENCY MEDICINE

## 2025-05-12 PROCEDURE — 85025 COMPLETE CBC W/AUTO DIFF WBC: CPT

## 2025-05-12 PROCEDURE — 80053 COMPREHEN METABOLIC PANEL: CPT

## 2025-05-12 PROCEDURE — A9270 NON-COVERED ITEM OR SERVICE: HCPCS | Performed by: EMERGENCY MEDICINE

## 2025-05-12 RX ORDER — DOXYCYCLINE 100 MG/1
100 TABLET ORAL ONCE
Status: COMPLETED | OUTPATIENT
Start: 2025-05-12 | End: 2025-05-12

## 2025-05-12 RX ORDER — METRONIDAZOLE 500 MG/1
500 TABLET ORAL 2 TIMES DAILY
Qty: 14 TABLET | Refills: 0 | Status: ACTIVE | OUTPATIENT
Start: 2025-05-12 | End: 2025-05-21

## 2025-05-12 RX ORDER — DOXYCYCLINE 100 MG/1
100 CAPSULE ORAL 2 TIMES DAILY
Qty: 14 CAPSULE | Refills: 0 | Status: ACTIVE | OUTPATIENT
Start: 2025-05-12 | End: 2025-05-19

## 2025-05-12 RX ORDER — CEFTRIAXONE 500 MG/1
500 INJECTION, POWDER, FOR SOLUTION INTRAMUSCULAR; INTRAVENOUS ONCE
Status: COMPLETED | OUTPATIENT
Start: 2025-05-12 | End: 2025-05-12

## 2025-05-12 RX ADMIN — DOXYCYCLINE 100 MG: 100 TABLET, FILM COATED ORAL at 11:31

## 2025-05-12 RX ADMIN — CEFTRIAXONE SODIUM 500 MG: 500 INJECTION, POWDER, FOR SOLUTION INTRAMUSCULAR; INTRAVENOUS at 11:31

## 2025-05-12 ASSESSMENT — PAIN DESCRIPTION - PAIN TYPE: TYPE: ACUTE PAIN

## 2025-05-12 ASSESSMENT — FIBROSIS 4 INDEX: FIB4 SCORE: .4930668410890925529

## 2025-05-12 NOTE — DISCHARGE INSTRUCTIONS
Fill and take the Flagyl and doxycycline prescriptions as written.  Please return if your symptoms worsen or change in any way.  Your partner will need to get a tested if you are positive for any STDs.  Please check [facility] later today for the results of the STD testing.

## 2025-05-12 NOTE — ED NOTES
Pt educated on discharge instructions. All questions & concerns addressed.    No IV to d/c     Pt ambulates to exit with steady gait and all belongings.

## 2025-05-12 NOTE — ED PROVIDER NOTES
"ED Provider Note  CHIEF COMPLAINT  Chief Complaint   Patient presents with    Pelvic Pain     Pt states merina in place but now c/o low abd pain, pelvis pain, and vaginal odor       HPI  Marly Tabares is a 34 y.o. female who presents for evaluation of pelvic pain.  Patient notes she has been having intermittent \"crampy\" pain in her bilateral pelvic regions.  She notes no discomfort currently and has had no fevers or chills.  She does note a discharge with a slightly foul odor.  She is concerned that she once again may have chlamydia as she is back with her partner who she got chlamydia from last December.  She notes no nausea or vomiting and has had no diarrhea or constipation.  She has no hematuria or dysuria. She dditionally notes that she wants to have her Mirena removed.  EXTERNAL RECORDS REVIEWED  Reviewed [place] records from December 2024 in which the patient was treated for chlamydia.  ROS  Constitutional: No fevers or chills  Skin: No rashes  HEENT: No sore throat, or runny nose  Neck: No neck pain  Chest: No pain   Pulm: No shortness of breath, cough, wheezing, stridor, or pain with inspiration/expiration  Gastrointestinal: No nausea, vomiting, diarrhea, constipation, bloating, melena, hematochezia.  Pain in bilateral low abdomen.  Genitourinary: No dysuria or hematuria  Musculoskeletal: No pain, swelling, or focal weakness  Heme: No bleeding or bruising problems.   Immuno: No hx of recurrent infections        LIMITATION TO HISTORY   None  OUTSIDE HISTORIAN(S):  None        PAST FAM HISTORY  No family history on file.    PAST MEDICAL HISTORY   has a past medical history of Asthma.    SOCIAL HISTORY  Social History     Tobacco Use    Smoking status: Former     Types: Cigarettes    Smokeless tobacco: Never   Vaping Use    Vaping status: Never Used   Substance and Sexual Activity    Alcohol use: Not Currently     Comment: socially    Drug use: No    Sexual activity: Not on file       SURGICAL HISTORY   " has a past surgical history that includes gyn surgery (2012).    CURRENT MEDICATIONS  Home Medications    **Home medications have not yet been reviewed for this encounter**          ALLERGIES  No Known Allergies    PHYSICAL EXAM  VITAL SIGNS: /67   Pulse 60   Temp 36.2 °C (97.1 °F) (Temporal)   Resp 17   Wt 56 kg (123 lb 7.3 oz)   SpO2 96%   BMI 18.23 kg/m²    Gen: Alert in no apparent distress.  HEENT: No signs of trauma, Bilateral external ears normal, Nose normal. Conjunctiva normal, Non-icteric.   Cardiovascular: Regular rate and rhythm, no murmurs.  Capillary refill less than 3 seconds to all extremities, 2+ distal pulses.  Thorax & Lungs: Normal breath sounds, No respiratory distress, No wheezing bilateral chest rise  Abdomen: Bowel sounds normal, Soft, No tenderness, No masses, No pulsatile masses. No Guarding or rebound  :External genitalia: without lesions, swelling, or discharge.  Inguinal: No lymphadenopathy, masses, erythema, or swelling  Vaginal vault: Moderate whitish discharge, rugae normal in appearance, no bleeding  Cervix: No lesions, discoloration, bleeding, os closed  Skin: Warm, Dry, No erythema, No rash noted to exposed areas.   Extremities: Intact distal pulses, No edema  Neurologic: Alert , no facial droop, grossly normal coordination and strength  Psychiatric: Affect pleasant    INITIAL IMPRESSION  Patient arrives in any symptomatic state however she is stating she has been bilateral lower pelvic cramping and is concerned that she might have bacterial vaginosis or an STD again.  She notes no symptoms of dysuria and no systemic symptoms otherwise.  After discussion with the patient, we will perform STD testing and perform a pelvic exam to ensure she does not have cervicitis.  This seems very unlikely given the fact that she does not have any pain or tenderness currently but it needs to be ruled out visually.  She states understanding that with removal of her Mirena will have to  come from her primary care provider as complications can occur removing this device.  At this point I do not feel imaging will be necessary, again because the patient has no current pain or tenderness.  I do not currently suspect TOA or PID, and intermittent bilateral torsion seems vastly unlikely.    ED observation? No    LABS  Results for orders placed or performed during the hospital encounter of 05/12/25   CBC WITH DIFFERENTIAL    Collection Time: 05/12/25  8:01 AM   Result Value Ref Range    WBC 3.8 (L) 4.8 - 10.8 K/uL    RBC 4.62 4.20 - 5.40 M/uL    Hemoglobin 14.0 12.0 - 16.0 g/dL    Hematocrit 41.9 37.0 - 47.0 %    MCV 90.7 81.4 - 97.8 fL    MCH 30.3 27.0 - 33.0 pg    MCHC 33.4 32.2 - 35.5 g/dL    RDW 49.5 35.9 - 50.0 fL    Platelet Count 332 164 - 446 K/uL    MPV 9.8 9.0 - 12.9 fL    Neutrophils-Polys 48.10 44.00 - 72.00 %    Lymphocytes 36.90 22.00 - 41.00 %    Monocytes 11.60 0.00 - 13.40 %    Eosinophils 1.80 0.00 - 6.90 %    Basophils 1.30 0.00 - 1.80 %    Immature Granulocytes 0.30 0.00 - 0.90 %    Nucleated RBC 0.00 0.00 - 0.20 /100 WBC    Neutrophils (Absolute) 1.82 1.82 - 7.42 K/uL    Lymphs (Absolute) 1.40 1.00 - 4.80 K/uL    Monos (Absolute) 0.44 0.00 - 0.85 K/uL    Eos (Absolute) 0.07 0.00 - 0.51 K/uL    Baso (Absolute) 0.05 0.00 - 0.12 K/uL    Immature Granulocytes (abs) 0.01 0.00 - 0.11 K/uL    NRBC (Absolute) 0.00 K/uL   COMP METABOLIC PANEL    Collection Time: 05/12/25  8:01 AM   Result Value Ref Range    Sodium 141 135 - 145 mmol/L    Potassium 3.5 (L) 3.6 - 5.5 mmol/L    Chloride 107 96 - 112 mmol/L    Co2 23 20 - 33 mmol/L    Anion Gap 11.0 7.0 - 16.0    Glucose 111 (H) 65 - 99 mg/dL    Bun 9 8 - 22 mg/dL    Creatinine 0.90 0.50 - 1.40 mg/dL    Calcium 9.3 8.5 - 10.5 mg/dL    Correct Calcium 9.1 8.5 - 10.5 mg/dL    AST(SGOT) 34 12 - 45 U/L    ALT(SGPT) 30 2 - 50 U/L    Alkaline Phosphatase 69 30 - 99 U/L    Total Bilirubin 1.0 0.1 - 1.5 mg/dL    Albumin 4.3 3.2 - 4.9 g/dL    Total Protein  7.6 6.0 - 8.2 g/dL    Globulin 3.3 1.9 - 3.5 g/dL    A-G Ratio 1.3 g/dL   LIPASE    Collection Time: 05/12/25  8:01 AM   Result Value Ref Range    Lipase 23 11 - 82 U/L   HCG QUAL SERUM    Collection Time: 05/12/25  8:01 AM   Result Value Ref Range    Beta-Hcg Qualitative Serum Negative Negative   ESTIMATED GFR    Collection Time: 05/12/25  8:01 AM   Result Value Ref Range    GFR (CKD-EPI) 87 >60 mL/min/1.73 m 2   T.PALLIDUM AB JOSE (Syphilis)    Collection Time: 05/12/25  8:01 AM   Result Value Ref Range    Syphilis, Treponemal Qual Non-Reactive Non-Reactive   URINALYSIS,CULTURE IF INDICATED    Collection Time: 05/12/25  8:22 AM    Specimen: Urine   Result Value Ref Range    Color Yellow     Character Clear     Specific Gravity 1.019 <1.035    Ph 6.0 5.0 - 8.0    Glucose Negative Negative mg/dL    Ketones Negative Negative mg/dL    Protein Negative Negative mg/dL    Bilirubin Negative Negative    Urobilinogen, Urine 1.0 <=1.0 EU/dL    Nitrite Negative Negative    Leukocyte Esterase Moderate (A) Negative    Occult Blood Negative Negative    Micro Urine Req Microscopic    URINE MICROSCOPIC (W/UA)    Collection Time: 05/12/25  8:22 AM   Result Value Ref Range    WBC 11-20 (A) /hpf    RBC 0-2 0 - 2 /hpf    Bacteria Many (A) None /hpf    Epithelial Cells 6-10 (A) 0 - 5 /hpf    Urine Casts 0-2 0 - 2 /lpf   Chlamydia/GC, PCR (Genital/Anal swab)    Collection Time: 05/12/25 10:24 AM    Specimen: Genital   Result Value Ref Range    Source Vaginal    VAGINAL PATHOGENS DNA PANEL    Collection Time: 05/12/25 10:24 AM   Result Value Ref Range    Candida species DNA Probe Negative Negative    Trichomonas vaginalis DNA Probe Negative Negative    Gardnerella vaginalis DNA Probe POSITIVE (A) Negative       ASSESSMENT, COURSE AND PLAN  Care Narrative: 11:39 a.m.: Patient reevaluated bedside.  She is calm, conversant, and states understanding she likely has Gardnerella and will need to be treated with Flagyl.  It is unclear whether  she has chlamydia so she will need to check her results on MyChart for both chlamydia and gonorrhea.  She has been treated for gonorrhea and will start the doxycycline presumptively today.  If the chlamydia is negative she will stop the doxycycline.  She will return if her symptoms worsen or change in any way      I have discussed management of the patient with the following physicians and JAYA's: None    Escalation of care considered, and ultimately not performed: Pelvic imaging    Barriers to care at this time, including but not limited to: None.     Decision tools and Rx drugs considered including, but not limited to : Antibiotics    Discussion of management with other QHP or appropriate source(s): None    The patient will not drink alcohol nor drive with prescribed medications. The patient will return for worsening symptoms and is stable at the time of discharge. The patient verbalizes understanding and will comply.    FINAL IMPRESSION  1. Vaginosis    2. Pelvic pain        Electronically signed by: Darrell Verduzco M.D., 5/12/2025 8:32 AM

## 2025-05-12 NOTE — ED TRIAGE NOTES
Chief Complaint   Patient presents with    Pelvic Pain     Pt states merina in place but now c/o low abd pain, pelvis pain, and vaginal odor

## 2025-05-12 NOTE — ED NOTES
Pt medicated per MAR. Pt requesting to leave prior to result completion. ERP aware and will work on d/c

## 2025-05-14 ENCOUNTER — PHARMACY VISIT (OUTPATIENT)
Dept: PHARMACY | Facility: MEDICAL CENTER | Age: 35
End: 2025-05-14
Payer: COMMERCIAL